# Patient Record
Sex: FEMALE | Race: WHITE | NOT HISPANIC OR LATINO | Employment: FULL TIME
[De-identification: names, ages, dates, MRNs, and addresses within clinical notes are randomized per-mention and may not be internally consistent; named-entity substitution may affect disease eponyms.]

---

## 2018-03-16 ENCOUNTER — TRANSCRIBE ORDERS (OUTPATIENT)
Dept: SCHEDULING | Age: 48
End: 2018-03-16

## 2018-03-16 DIAGNOSIS — R19.7 DIARRHEA, UNSPECIFIED TYPE: ICD-10-CM

## 2018-03-16 DIAGNOSIS — R10.11 ABDOMINAL PAIN, RIGHT UPPER QUADRANT: Primary | ICD-10-CM

## 2018-03-16 DIAGNOSIS — K50.00 CICATRIZING ENTEROCOLITIS (CMS/HCC): ICD-10-CM

## 2018-03-16 DIAGNOSIS — R11.0 NAUSEA: ICD-10-CM

## 2018-03-19 ENCOUNTER — TRANSCRIBE ORDERS (OUTPATIENT)
Dept: SCHEDULING | Age: 48
End: 2018-03-19

## 2018-03-19 DIAGNOSIS — R19.7 DIARRHEA, UNSPECIFIED TYPE: ICD-10-CM

## 2018-03-19 DIAGNOSIS — R10.11 ABDOMINAL PAIN, RIGHT UPPER QUADRANT: Primary | ICD-10-CM

## 2018-03-19 DIAGNOSIS — R11.0 NAUSEA: ICD-10-CM

## 2018-03-19 DIAGNOSIS — K50.00 CICATRIZING ENTEROCOLITIS (CMS/HCC): ICD-10-CM

## 2018-03-26 ENCOUNTER — HOSPITAL ENCOUNTER (OUTPATIENT)
Dept: RADIOLOGY | Facility: HOSPITAL | Age: 48
Discharge: HOME | End: 2018-03-26
Attending: INTERNAL MEDICINE
Payer: COMMERCIAL

## 2018-03-26 DIAGNOSIS — K50.00 CICATRIZING ENTEROCOLITIS (CMS/HCC): ICD-10-CM

## 2018-03-26 DIAGNOSIS — R10.11 ABDOMINAL PAIN, RIGHT UPPER QUADRANT: ICD-10-CM

## 2018-03-26 DIAGNOSIS — R11.0 NAUSEA: ICD-10-CM

## 2018-03-26 DIAGNOSIS — R19.7 DIARRHEA, UNSPECIFIED TYPE: ICD-10-CM

## 2018-03-26 PROCEDURE — 76700 US EXAM ABDOM COMPLETE: CPT

## 2018-03-27 ENCOUNTER — HOSPITAL ENCOUNTER (OUTPATIENT)
Dept: RADIOLOGY | Facility: HOSPITAL | Age: 48
Discharge: HOME | End: 2018-03-27
Attending: INTERNAL MEDICINE
Payer: COMMERCIAL

## 2018-03-27 VITALS — BODY MASS INDEX: 35.17 KG/M2 | WEIGHT: 206 LBS | HEIGHT: 64 IN

## 2018-03-27 DIAGNOSIS — R19.7 DIARRHEA, UNSPECIFIED TYPE: ICD-10-CM

## 2018-03-27 DIAGNOSIS — K50.00 CICATRIZING ENTEROCOLITIS (CMS/HCC): ICD-10-CM

## 2018-03-27 DIAGNOSIS — R11.0 NAUSEA: ICD-10-CM

## 2018-03-27 DIAGNOSIS — R10.11 ABDOMINAL PAIN, RIGHT UPPER QUADRANT: ICD-10-CM

## 2018-03-27 RX ORDER — GADOBUTROL 604.72 MG/ML
9.3 INJECTION INTRAVENOUS
Status: COMPLETED | OUTPATIENT
Start: 2018-03-27 | End: 2018-03-27

## 2018-03-27 RX ADMIN — GADOBUTROL 9.3 ML: 604.72 INJECTION INTRAVENOUS at 08:57

## 2018-03-27 NOTE — NURSING NOTE
Pt screened for Hyoscyamine contraindications using standard questions- no issues found.  Hyoscyamine 0.125 mg - 2 tabs - given SL @ 8:30 am per study requirements.   Lot [id number]  Exp 02/19  MALI ELIZALDE

## 2019-01-17 ENCOUNTER — OFFICE VISIT (OUTPATIENT)
Dept: FAMILY MEDICINE | Facility: CLINIC | Age: 49
End: 2019-01-17
Payer: COMMERCIAL

## 2019-01-17 VITALS
DIASTOLIC BLOOD PRESSURE: 78 MMHG | OXYGEN SATURATION: 98 % | WEIGHT: 216 LBS | SYSTOLIC BLOOD PRESSURE: 120 MMHG | RESPIRATION RATE: 18 BRPM | BODY MASS INDEX: 36.88 KG/M2 | HEART RATE: 84 BPM | HEIGHT: 64 IN | TEMPERATURE: 98.2 F

## 2019-01-17 DIAGNOSIS — J01.90 ACUTE BACTERIAL RHINOSINUSITIS: Primary | ICD-10-CM

## 2019-01-17 DIAGNOSIS — B96.89 ACUTE BACTERIAL RHINOSINUSITIS: Primary | ICD-10-CM

## 2019-01-17 PROCEDURE — 99213 OFFICE O/P EST LOW 20 MIN: CPT | Performed by: NURSE PRACTITIONER

## 2019-01-17 RX ORDER — AZITHROMYCIN 250 MG/1
TABLET, FILM COATED ORAL
Qty: 6 TABLET | Refills: 0 | Status: SHIPPED | OUTPATIENT
Start: 2019-01-17 | End: 2019-01-23 | Stop reason: ALTCHOICE

## 2019-01-17 RX ORDER — AZELASTINE HCL 205.5 UG/1
1 SPRAY NASAL 2 TIMES DAILY PRN
Qty: 30 ML | Refills: 1 | Status: SHIPPED | OUTPATIENT
Start: 2019-01-17 | End: 2019-07-16

## 2019-01-17 RX ORDER — BENZONATATE 100 MG/1
100 CAPSULE ORAL 3 TIMES DAILY PRN
Qty: 30 CAPSULE | Refills: 0 | Status: SHIPPED | OUTPATIENT
Start: 2019-01-17 | End: 2019-01-23 | Stop reason: ALTCHOICE

## 2019-01-17 ASSESSMENT — ENCOUNTER SYMPTOMS
COUGH: 1
NAUSEA: 0
DIARRHEA: 0
SORE THROAT: 1
FEVER: 0
CHILLS: 1
SHORTNESS OF BREATH: 1
VOMITING: 0
ABDOMINAL PAIN: 0
CONSTIPATION: 0
SINUS PAIN: 1
FATIGUE: 1

## 2019-01-17 NOTE — PATIENT INSTRUCTIONS
Swollen nasal passages can cause postnasal drip and cough.  Azelastine for nasal drainage, Tessalon for cough.  Drink warm beverages such as tea to dissolve secretions already in your throat, avoid milk products, and maintain hydration.    Concern for ABRS due to presentation, pt allergic to PCN, Zithromax prescribed.

## 2019-01-17 NOTE — PROGRESS NOTES
[facility]  [address] [place] [address]  [phone]     Reason for visit:   Chief Complaint   Patient presents with   • Sinusitis     sinus pressure and pain x 5 days, maxillary tenderness, upper teeth hurt      HPI   Lima Gutierrez is a 48 y.o. female who presents with rhinosinusitis        Medical History:  No past medical history on file.    Surgical History:  Past Surgical History:   Procedure Laterality Date   •  SECTION     • TONSILLECTOMY         Social History:  Social History     Social History Narrative   • No narrative on file       Family History:  Family History   Problem Relation Age of Onset   • Breast cancer Mother    • Hodgkin's lymphoma Mother    • Leukemia Father        Allergies:  Penicillins    Current Medications:  Current Outpatient Prescriptions   Medication Sig Dispense Refill   • FLUoxetine (PROzac) 10 mg capsule Take 10 mg by mouth daily.     • Lactobac 40-Bifido 3-S.thermop 100 billion cell capsule Take by mouth.     • omeprazole (PriLOSEC) 20 mg capsule Take 20 mg by mouth daily before breakfast.     • sulfaSALAzine (AZULFIDINE) 500 mg tablet Take 500 mg by mouth 4 (four) times a day.     • predniSONE (DELTASONE) 10 mg tablet Take 10 mg by mouth daily.       No current facility-administered medications for this visit.        Review of Systems:  Review of Systems   Constitutional: Positive for chills and fatigue. Negative for fever.   HENT: Positive for ear pain, postnasal drip, sinus pain and sore throat.    Respiratory: Positive for cough (productive) and shortness of breath (mild per pt, mild GASTELUM).    Cardiovascular: Negative for chest pain.   Gastrointestinal: Negative for abdominal pain, constipation, diarrhea, nausea and vomiting.       Objective     Vital Signs:  Vitals:    19 1431   BP: 120/78   Pulse: 84   Resp: 18   Temp: 36.8 °C (98.2 °F)   SpO2: 98%       BMI:  Body mass index is 37.08 kg/m². [unfilled]     Physical Exam    Constitutional: She is oriented to person, place, and time. Vital signs are normal. She appears well-developed and well-nourished.   HENT:   Head: Normocephalic and atraumatic.   Right Ear: Hearing and tympanic membrane normal.   Left Ear: Hearing and tympanic membrane normal.   Nose: Mucosal edema present. Right sinus exhibits maxillary sinus tenderness and frontal sinus tenderness. Left sinus exhibits maxillary sinus tenderness and frontal sinus tenderness.   Mouth/Throat: Uvula is midline.   PND present   Cardiovascular: Normal rate, regular rhythm and normal heart sounds.    Pulmonary/Chest: Effort normal and breath sounds normal.   Neurological: She is alert and oriented to person, place, and time.   Psychiatric: She has a normal mood and affect. Her speech is normal and behavior is normal. Judgment and thought content normal. Cognition and memory are normal.       Recent labs before today:     Lab Results   Component Value Date    WBC 6.53 04/07/2015    HGB 11.2 (L) 04/07/2015    HCT 34.1 (L) 04/07/2015     04/07/2015    ALT 18 04/05/2015    AST 15 04/05/2015     04/05/2015    K 4.0 04/05/2015     04/05/2015    CREATININE 0.6 04/05/2015    BUN 10 04/05/2015    CO2 25 04/05/2015        Procedures   Assessment   [unfilled]  Problem List Items Addressed This Visit     None              MADIE Mace  1/17/2019

## 2019-01-23 ENCOUNTER — HOSPITAL ENCOUNTER (EMERGENCY)
Facility: HOSPITAL | Age: 49
Discharge: HOME | End: 2019-01-23
Attending: EMERGENCY MEDICINE
Payer: COMMERCIAL

## 2019-01-23 ENCOUNTER — APPOINTMENT (EMERGENCY)
Dept: RADIOLOGY | Facility: HOSPITAL | Age: 49
End: 2019-01-23
Attending: EMERGENCY MEDICINE
Payer: COMMERCIAL

## 2019-01-23 VITALS
HEIGHT: 64 IN | SYSTOLIC BLOOD PRESSURE: 180 MMHG | RESPIRATION RATE: 18 BRPM | HEART RATE: 85 BPM | WEIGHT: 215 LBS | BODY MASS INDEX: 36.7 KG/M2 | DIASTOLIC BLOOD PRESSURE: 80 MMHG | OXYGEN SATURATION: 97 % | TEMPERATURE: 98.8 F
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
## 2019-01-23 PROCEDURE — 90471 IMMUNIZATION ADMIN: CPT | Performed by: PHYSICIAN ASSISTANT

## 2019-01-23 PROCEDURE — 63600000 HC DRUGS/DETAIL CODE: Performed by: PHYSICIAN ASSISTANT

## 2019-01-23 PROCEDURE — 3E0234Z INTRODUCTION OF SERUM, TOXOID AND VACCINE INTO MUSCLE, PERCUTANEOUS APPROACH: ICD-10-PCS | Performed by: EMERGENCY MEDICINE

## 2019-01-23 PROCEDURE — 90715 TDAP VACCINE 7 YRS/> IM: CPT | Performed by: PHYSICIAN ASSISTANT

## 2019-01-23 PROCEDURE — 70486 CT MAXILLOFACIAL W/O DYE: CPT

## 2019-01-23 PROCEDURE — 99284 EMERGENCY DEPT VISIT MOD MDM: CPT | Mod: 25

## 2019-01-23 RX ADMIN — TETANUS TOXOID, REDUCED DIPHTHERIA TOXOID AND ACELLULAR PERTUSSIS VACCINE, ADSORBED 0.5 ML: 5; 2.5; 8; 8; 2.5 SUSPENSION INTRAMUSCULAR at 17:19

## 2019-01-23 ASSESSMENT — ENCOUNTER SYMPTOMS
BRUISES/BLEEDS EASILY: 0
SEIZURES: 0
WOUND: 1
NECK PAIN: 0
LIGHT-HEADEDNESS: 0
FEVER: 0
NAUSEA: 1
COLOR CHANGE: 1
VOMITING: 0
LOSS OF CONSCIOUSNESS: 0
SHORTNESS OF BREATH: 0
DIZZINESS: 0
FACIAL SWELLING: 1
CONFUSION: 0
WEAKNESS: 0

## 2019-01-23 NOTE — ED PROVIDER NOTES
HPI     Chief Complaint   Patient presents with   • Fall     48 y.o. female presents to ED s/p mechanical fall that occurred last night. Pt was walking out of a restaurant last night and tripped over a curb outside in the parking lot and fell. She struck her face and presents with facial swelling and ecchymosis. Denies LOC. Tetanus status is unknown. Pt is not anticoagulated. Admits to nausea. Denies nosebleeds, vomiting, confusion, visual disturbances, dizziness, lightheadedness, seizures, fevers, neck pain, or dental injury.     History provided by:  Patient   used: No    Trauma  Mechanism of injury: fall  Injury location: face  Injury location detail: nose  Incident location: outdoors  Arrived directly from scene: no     Fall:       Fall occurred: tripped and walking       Impact surface: concrete       Point of impact: face       Entrapped after fall: no    Protective equipment:        None       Suspicion of alcohol use: no       Suspicion of drug use: no    EMS/PTA data:       Ambulatory at scene: yes       Oriented to: person, place, situation and time       Loss of consciousness: no       Amnesic to event: no    Current symptoms:       Associated symptoms:             Reports nausea.             Denies chest pain, loss of consciousness, neck pain, seizures and vomiting.     Relevant PMH:       Pharmacological risk factors:             No anticoagulation therapy.        Tetanus status: unknown       Patient History     Past Medical History:   Diagnosis Date   • Crohn's colitis (CMS/HCC) (HCC)        Past Surgical History:   Procedure Laterality Date   •  SECTION     • TONSILLECTOMY         Family History   Problem Relation Age of Onset   • Breast cancer Mother    • Hodgkin's lymphoma Mother    • Leukemia Father        Social History   Substance Use Topics   • Smoking status: Current Every Day Smoker   • Smokeless tobacco: Never Used   • Alcohol use Not on file       Systems  "Reviewed from Nursing Triage:          Review of Systems     Review of Systems   Constitutional: Negative for fever.   HENT: Positive for facial swelling. Negative for dental problem, ear discharge and nosebleeds.    Eyes: Negative for visual disturbance.   Respiratory: Negative for shortness of breath.    Cardiovascular: Negative for chest pain.   Gastrointestinal: Positive for nausea. Negative for vomiting.   Musculoskeletal: Negative for gait problem and neck pain.   Skin: Positive for color change (b/l periorbital ecchymosis) and wound (multiple facial abrasions).   Neurological: Negative for dizziness, seizures, loss of consciousness, weakness and light-headedness.   Hematological: Does not bruise/bleed easily.   Psychiatric/Behavioral: Negative for behavioral problems and confusion.        Physical Exam     ED Triage Vitals [01/23/19 1516]   Temp Heart Rate Resp BP SpO2   37.1 °C (98.8 °F) 78 18 (!) 227/98 97 %      Temp Source Heart Rate Source Patient Position BP Location FiO2 (%) (Set)   Temporal -- -- -- --                     Patient Vitals for the past 24 hrs:   BP Temp Temp src Pulse Resp SpO2 Height Weight   01/23/19 1516 (!) 227/98 37.1 °C (98.8 °F) Temporal 78 18 97 % 1.626 m (5' 4\") 97.5 kg (215 lb)           Physical Exam   Constitutional: She is oriented to person, place, and time. She appears well-developed and well-nourished. No distress.   HENT:   Head: Normocephalic.   Right Ear: External ear normal.   Left Ear: External ear normal.   Nose: No epistaxis.   Mouth/Throat: Uvula is midline, oropharynx is clear and moist and mucous membranes are normal. Normal dentition.   No dried blood to b/l nares.   Abrasion and swelling to nasal bridge.  Abrasion to the upper lip and to the inside of her right upper lip.   Ecchymosis to b/l periorbitals.    Eyes: Conjunctivae and EOM are normal. Pupils are equal, round, and reactive to light.   Neck: Normal range of motion. Neck supple.   Cardiovascular: " Normal rate.    Pulmonary/Chest: Effort normal. No respiratory distress.   Musculoskeletal: Normal range of motion. She exhibits no edema, tenderness or deformity.   Neurological: She is alert and oriented to person, place, and time. She has normal strength. She is not disoriented. No sensory deficit.   Skin: Skin is warm and dry.   Psychiatric: She has a normal mood and affect. Her behavior is normal. Judgment and thought content normal.   Nursing note and vitals reviewed.           Procedures    ED Course & [facility]     Labs Reviewed - No data to display    CT FACIAL BONES WITHOUT IV CONTRAST    (Results Pending)               [facility]         ED Course as of Jan 23 1541 Wed Jan 23, 2019 1541 Impression: facial injury s/p mechanical fall x last night  Plan: CT facial bones, tetanus     [CK]      ED Course User Index  [CK] Awa Boles      By signing my name below, Awa MCDANIELS, attest that this documentation has been prepared under the direction and in the presence of STEFAN Barfield PA-C.    1/23/2019 3:41 PM      Opal MCDANIELS, personally performed the services described in this documentation, as documented by the scribe in my presence, and it is both accurate and complete.  1/23/2019 3:49 PM           Awa Boles  01/23/19 1540       Awa Boles  01/23/19 1542       Opal Barfield PA C  01/23/19 1549

## 2019-01-23 NOTE — ED ATTESTATION NOTE
1/23/20195:17 PM  I have personally seen and examined the patient.  I reviewed and agree with the PA/NP/Resident's assessment and plan of care.         Fady Mendoza,   01/23/19 1717

## 2019-02-15 RX ORDER — FLUOXETINE 10 MG/1
10 CAPSULE ORAL DAILY
Qty: 90 CAPSULE | Refills: 3 | Status: SHIPPED | OUTPATIENT
Start: 2019-02-15 | End: 2019-02-18 | Stop reason: SDUPTHER

## 2019-02-18 ENCOUNTER — OFFICE VISIT (OUTPATIENT)
Dept: FAMILY MEDICINE | Facility: CLINIC | Age: 49
End: 2019-02-18
Payer: COMMERCIAL

## 2019-02-18 VITALS
WEIGHT: 211.4 LBS | OXYGEN SATURATION: 99 % | DIASTOLIC BLOOD PRESSURE: 90 MMHG | SYSTOLIC BLOOD PRESSURE: 145 MMHG | TEMPERATURE: 98.3 F | HEART RATE: 72 BPM | RESPIRATION RATE: 20 BRPM | HEIGHT: 64 IN | BODY MASS INDEX: 36.09 KG/M2

## 2019-02-18 DIAGNOSIS — F32.9 MAJOR DEPRESSIVE DISORDER WITH CURRENT ACTIVE EPISODE, UNSPECIFIED DEPRESSION EPISODE SEVERITY, UNSPECIFIED WHETHER RECURRENT: ICD-10-CM

## 2019-02-18 DIAGNOSIS — Z00.00 ENCOUNTER FOR GENERAL ADULT MEDICAL EXAMINATION WITHOUT ABNORMAL FINDINGS: Primary | ICD-10-CM

## 2019-02-18 DIAGNOSIS — K21.9 GASTROESOPHAGEAL REFLUX DISEASE WITHOUT ESOPHAGITIS: ICD-10-CM

## 2019-02-18 DIAGNOSIS — E66.812 CLASS 2 OBESITY DUE TO EXCESS CALORIES WITHOUT SERIOUS COMORBIDITY WITH BODY MASS INDEX (BMI) OF 36.0 TO 36.9 IN ADULT: ICD-10-CM

## 2019-02-18 DIAGNOSIS — R03.0 ELEVATED BLOOD PRESSURE READING WITHOUT DIAGNOSIS OF HYPERTENSION: ICD-10-CM

## 2019-02-18 DIAGNOSIS — K50.10 CROHN'S DISEASE OF COLON WITHOUT COMPLICATION (CMS/HCC): ICD-10-CM

## 2019-02-18 DIAGNOSIS — E66.09 CLASS 2 OBESITY DUE TO EXCESS CALORIES WITHOUT SERIOUS COMORBIDITY WITH BODY MASS INDEX (BMI) OF 36.0 TO 36.9 IN ADULT: ICD-10-CM

## 2019-02-18 PROBLEM — Q21.10 ASD (ATRIAL SEPTAL DEFECT): Status: ACTIVE | Noted: 2019-02-18

## 2019-02-18 PROBLEM — J30.9 ALLERGIC RHINITIS: Status: ACTIVE | Noted: 2019-02-18

## 2019-02-18 PROCEDURE — 93000 ELECTROCARDIOGRAM COMPLETE: CPT | Performed by: FAMILY MEDICINE

## 2019-02-18 PROCEDURE — 99396 PREV VISIT EST AGE 40-64: CPT | Performed by: FAMILY MEDICINE

## 2019-02-18 RX ORDER — SULFASALAZINE 500 MG/1
500 TABLET ORAL 4 TIMES DAILY
COMMUNITY
Start: 2019-02-18

## 2019-02-18 RX ORDER — FLUOXETINE 10 MG/1
10 CAPSULE ORAL DAILY
Qty: 90 CAPSULE | Refills: 3 | Status: SHIPPED | OUTPATIENT
Start: 2019-02-18 | End: 2020-02-07

## 2019-02-18 RX ORDER — OMEPRAZOLE 20 MG/1
20 CAPSULE, DELAYED RELEASE ORAL
COMMUNITY
Start: 2019-02-18

## 2019-02-18 ASSESSMENT — ENCOUNTER SYMPTOMS
SHORTNESS OF BREATH: 0
BRUISES/BLEEDS EASILY: 0
FREQUENCY: 0
WEAKNESS: 0
JOINT SWELLING: 0
DYSPHORIC MOOD: 0
PALPITATIONS: 0
ABDOMINAL PAIN: 0
UNEXPECTED WEIGHT CHANGE: 1

## 2019-02-18 NOTE — PROGRESS NOTES
Chief Complaint  Chief Complaint   Patient presents with   • Annual Exam       History of Present Illness  Former pt at [facility].  Here for PE. Sees Dr Garcia for Crohn's. Has reasonably good control.  Has been avoiding Biologics and getting about 1 Crohn's flare a year that required steroid.  Depression has been controlled.  Discussed whether or not she wanted to try to go off Prozac and stated it has been quite helpful and she is now on a lower dose than previously and she does not want to try off it.  Aware of benefits/risks.    Has gained weight, about 10 pounds this yr.  BP has been elevated.  Working on diet.  No reg exercise and plans to work on that soon.   Due for fasting labs.  Has gyn  and then will have mammo.  Tdap up-to-date recently.        Current Outpatient Prescriptions   Medication Sig Dispense Refill   • FLUoxetine (PROzac) 10 mg capsule Take 1 capsule (10 mg total) by mouth daily. 90 capsule 3   • Lactobac 40-Bifido 3-S.thermop 100 billion cell capsule Take by mouth.     • omeprazole (PriLOSEC) 20 mg capsule Take 20 mg by mouth daily before breakfast.     • sulfaSALAzine (AZULFIDINE) 500 mg tablet Take 500 mg by mouth 4 (four) times a day.     • azelastine 0.15 % (205.5 mcg) nasal spray Administer 1 spray into each nostril 2 (two) times a day as needed for rhinitis. (Patient not taking: Reported on 2019 ) 30 mL 1     No current facility-administered medications for this visit.        There is no problem list on file for this patient.      Past Medical History:   Diagnosis Date   • Crohn's colitis (CMS/HCC) (HCC)        Past Surgical History:   Procedure Laterality Date   •  SECTION     • TONSILLECTOMY         Family History   Problem Relation Age of Onset   • Breast cancer Mother    • Hodgkin's lymphoma Mother    • Leukemia Father        Social History     Social History   • Marital status:      Spouse name: N/A   • Number of children: N/A   • Years of education: N/A  "    Occupational History   • Not on file.     Social History Main Topics   • Smoking status: Current Every Day Smoker   • Smokeless tobacco: Never Used   • Alcohol use Yes      Comment: socially   • Drug use: No   • Sexual activity: Not on file     Other Topics Concern   • Not on file     Social History Narrative   • No narrative on file       Allergies   Allergen Reactions   • Penicillins Rash       Review of Systems   Constitutional: Positive for unexpected weight change (Weight gain).   HENT: Negative for ear pain.    Eyes: Negative for visual disturbance.   Respiratory: Negative for shortness of breath.    Cardiovascular: Negative for chest pain and palpitations.   Gastrointestinal: Negative for abdominal pain.   Endocrine: Negative for polyuria.   Genitourinary: Negative for frequency.   Musculoskeletal: Negative for joint swelling.   Skin: Negative for rash.   Allergic/Immunologic: Negative for immunocompromised state.   Neurological: Negative for weakness.   Hematological: Does not bruise/bleed easily.   Psychiatric/Behavioral: Negative for dysphoric mood.       Vitals:    02/18/19 1745   BP: (!) 145/90   BP Location: Left upper arm   Patient Position: Sitting   Pulse: 72   Resp: 20   Temp: 36.8 °C (98.3 °F)   TempSrc: Oral   SpO2: 99%   Weight: 95.9 kg (211 lb 6.4 oz)   Height: 1.626 m (5' 4\")     Body mass index is 36.29 kg/m².    Physical Exam   Constitutional: She is oriented to person, place, and time. She appears well-developed and well-nourished.   HENT:   Head: Normocephalic.   Right Ear: External ear normal.   Left Ear: External ear normal.   Nose: Nose normal.   Mouth/Throat: Oropharynx is clear and moist. No oropharyngeal exudate.   Eyes: Conjunctivae and EOM are normal. Pupils are equal, round, and reactive to light.   Neck: Normal range of motion. No thyromegaly present.   Cardiovascular: Normal rate, regular rhythm and intact distal pulses.  Exam reveals no gallop and no friction rub.    Murmur " (Trace 1/6 diastolic murmur) heard.  Pulmonary/Chest: Effort normal and breath sounds normal. She has no wheezes. She has no rales.   Abdominal: Soft. Bowel sounds are normal. She exhibits no distension. There is no tenderness.   Musculoskeletal: Normal range of motion.   Lymphadenopathy:     She has no cervical adenopathy.   Neurological: She is alert and oriented to person, place, and time. Coordination normal.   Skin: No rash noted.   Psychiatric: She has a normal mood and affect. Her behavior is normal. Judgment and thought content normal.   Nursing note and vitals reviewed.    EKG with a normal sinus rhythm, no abnormality of intervals.  Possible left atrial enlargement, no acute ST-T wave changes and no prior for comparison.    Problem List Items Addressed This Visit        Other    Class 2 obesity due to excess calories with body mass index (BMI) of 36.0 to 36.9 in adult     Weight loss goals discussed.         Major depressive disorder     Continues on Prozac and refilled.  Wants to continue at the same dose.         Relevant Medications    FLUoxetine (PROzac) 10 mg capsule    Crohn's colitis (CMS/HCC) ([facility])     Has reasonably good control and continuing follow-up with Dr. Garcia.         Relevant Medications    sulfaSALAzine (AZULFIDINE) 500 mg tablet    Gastroesophageal reflux disease without esophagitis    Relevant Medications    omeprazole (PriLOSEC) 20 mg capsule      Other Visit Diagnoses     Encounter for general adult medical examination without abnormal findings    -  Primary    Regular exercise discussed.  Check fasting labs    Relevant Orders    Comprehensive metabolic panel    CBC and Differential    Lipid Prof w Refl    TSH w reflex FT4    Urinalysis with microscopic    Elevated blood pressure reading without diagnosis of hypertension        Discussed weight loss, increase in exercise, low sodium and caffeine.  Recheck BP 3 months.  Meds if still high.    Relevant Orders    Comprehensive  metabolic panel    CBC and Differential    Lipid Prof w Refl    TSH w reflex FT4    Urinalysis with microscopic    ECG 12 LEAD OFFICE PERFORMED (Completed)          Return in about 3 months (around 5/18/2019).          Jessica Goodman MD  [facility]  Family Medicine in [place]  [address] [place] [address],  [address]  [place]  [address]  P: [phone]  F: [phone]

## 2019-03-05 LAB
ALBUMIN SERPL-MCNC: 4.3 G/DL (ref 3.5–5.5)
ALBUMIN/GLOB SERPL: 1.7 {RATIO} (ref 1.2–2.2)
ALP SERPL-CCNC: 59 IU/L (ref 39–117)
ALT SERPL-CCNC: 48 IU/L (ref 0–32)
AST SERPL-CCNC: 38 IU/L (ref 0–40)
BASOPHILS # BLD AUTO: 0 X10E3/UL (ref 0–0.2)
BASOPHILS NFR BLD AUTO: 1 %
BILIRUB SERPL-MCNC: 0.3 MG/DL (ref 0–1.2)
BUN SERPL-MCNC: 11 MG/DL (ref 6–24)
BUN/CREAT SERPL: 16 (ref 9–23)
CALCIUM SERPL-MCNC: 9 MG/DL (ref 8.7–10.2)
CHLORIDE SERPL-SCNC: 104 MMOL/L (ref 96–106)
CHOLEST SERPL-MCNC: 179 MG/DL (ref 100–199)
CO2 SERPL-SCNC: 21 MMOL/L (ref 20–29)
CREAT SERPL-MCNC: 0.7 MG/DL (ref 0.57–1)
EOSINOPHIL # BLD AUTO: 0.1 X10E3/UL (ref 0–0.4)
EOSINOPHIL NFR BLD AUTO: 2 %
ERYTHROCYTE [DISTWIDTH] IN BLOOD BY AUTOMATED COUNT: 14.2 % (ref 12.3–15.4)
GLOBULIN SER CALC-MCNC: 2.6 G/DL (ref 1.5–4.5)
GLUCOSE SERPL-MCNC: 111 MG/DL (ref 65–99)
HCT VFR BLD AUTO: 37.5 % (ref 34–46.6)
HDLC SERPL-MCNC: 71 MG/DL
HGB BLD-MCNC: 12.5 G/DL (ref 11.1–15.9)
IMM GRANULOCYTES # BLD AUTO: 0 X10E3/UL (ref 0–0.1)
IMM GRANULOCYTES NFR BLD AUTO: 0 %
LAB CORP EGFR IF AFRICN AM: 119 ML/MIN/1.73
LAB CORP EGFR IF NONAFRICN AM: 104 ML/MIN/1.73
LDLC SERPL CALC-MCNC: 93 MG/DL (ref 0–99)
LDLC/HDLC SERPL: 1.3 RATIO (ref 0–3.2)
LYMPHOCYTES # BLD AUTO: 1.5 X10E3/UL (ref 0.7–3.1)
LYMPHOCYTES NFR BLD AUTO: 40 %
MCH RBC QN AUTO: 31.3 PG (ref 26.6–33)
MCHC RBC AUTO-ENTMCNC: 33.3 G/DL (ref 31.5–35.7)
MCV RBC AUTO: 94 FL (ref 79–97)
MONOCYTES # BLD AUTO: 0.4 X10E3/UL (ref 0.1–0.9)
MONOCYTES NFR BLD AUTO: 10 %
NEUTROPHILS # BLD AUTO: 1.7 X10E3/UL (ref 1.4–7)
NEUTROPHILS NFR BLD AUTO: 47 %
PLATELET # BLD AUTO: 179 X10E3/UL (ref 150–379)
POTASSIUM SERPL-SCNC: 4.5 MMOL/L (ref 3.5–5.2)
PROT SERPL-MCNC: 6.9 G/DL (ref 6–8.5)
RBC # BLD AUTO: 3.99 X10E6/UL (ref 3.77–5.28)
SODIUM SERPL-SCNC: 141 MMOL/L (ref 134–144)
TRIGL SERPL-MCNC: 76 MG/DL (ref 0–149)
VLDLC SERPL CALC-MCNC: 15 MG/DL (ref 5–40)
WBC # BLD AUTO: 3.7 X10E3/UL (ref 3.4–10.8)

## 2019-03-06 PROBLEM — R79.89 ELEVATED LFTS: Status: ACTIVE | Noted: 2019-03-06

## 2019-03-06 PROBLEM — R73.01 IMPAIRED FASTING GLUCOSE: Status: ACTIVE | Noted: 2019-03-06

## 2019-03-06 LAB
APPEARANCE UR: (no result)
BACTERIA #/AREA URNS HPF: NORMAL /[HPF]
BILIRUB UR QL STRIP: NEGATIVE
COLOR UR: YELLOW
EPI CELLS #/AREA URNS HPF: NORMAL /HPF
GLUCOSE UR QL: NEGATIVE
HGB UR QL STRIP: NEGATIVE
KETONES UR QL STRIP: NEGATIVE
LEUKOCYTE ESTERASE UR QL STRIP: NEGATIVE
MICRO URNS: (no result)
MICRO URNS: (no result)
MUCOUS THREADS URNS QL MICRO: PRESENT
NITRITE UR QL STRIP: NEGATIVE
PH UR STRIP: 7 [PH] (ref 5–7.5)
PROT UR QL STRIP: NEGATIVE
RBC #/AREA URNS HPF: NORMAL /HPF
SP GR UR: 1.02 (ref 1–1.03)
T4 FREE SERPL-MCNC: 1.04 NG/DL (ref 0.82–1.77)
TSH SERPL DL<=0.005 MIU/L-ACNC: 2.86 UIU/ML (ref 0.45–4.5)
UROBILINOGEN UR STRIP-MCNC: 0.2 EU/DL (ref 0.2–1)
WBC #/AREA URNS HPF: NORMAL /HPF

## 2019-03-12 DIAGNOSIS — R79.89 ELEVATED LFTS: ICD-10-CM

## 2019-03-12 DIAGNOSIS — R73.01 ELEVATED FASTING GLUCOSE: Primary | ICD-10-CM

## 2020-02-06 DIAGNOSIS — F32.9 MAJOR DEPRESSIVE DISORDER WITH CURRENT ACTIVE EPISODE, UNSPECIFIED DEPRESSION EPISODE SEVERITY, UNSPECIFIED WHETHER RECURRENT: ICD-10-CM

## 2020-02-07 RX ORDER — FLUOXETINE 10 MG/1
CAPSULE ORAL
Qty: 90 CAPSULE | Refills: 3 | Status: SHIPPED | OUTPATIENT
Start: 2020-02-07 | End: 2021-03-01 | Stop reason: SDUPTHER

## 2020-02-25 ENCOUNTER — OFFICE VISIT (OUTPATIENT)
Dept: FAMILY MEDICINE | Facility: CLINIC | Age: 50
End: 2020-02-25
Payer: COMMERCIAL

## 2020-02-25 VITALS
DIASTOLIC BLOOD PRESSURE: 85 MMHG | HEART RATE: 65 BPM | WEIGHT: 209 LBS | HEIGHT: 64 IN | OXYGEN SATURATION: 97 % | BODY MASS INDEX: 35.68 KG/M2 | RESPIRATION RATE: 20 BRPM | TEMPERATURE: 97.5 F | SYSTOLIC BLOOD PRESSURE: 138 MMHG

## 2020-02-25 DIAGNOSIS — Z00.00 ENCOUNTER FOR GENERAL ADULT MEDICAL EXAMINATION WITHOUT ABNORMAL FINDINGS: Primary | ICD-10-CM

## 2020-02-25 DIAGNOSIS — E66.09 CLASS 2 OBESITY DUE TO EXCESS CALORIES WITHOUT SERIOUS COMORBIDITY WITH BODY MASS INDEX (BMI) OF 36.0 TO 36.9 IN ADULT: ICD-10-CM

## 2020-02-25 DIAGNOSIS — R73.01 IMPAIRED FASTING GLUCOSE: ICD-10-CM

## 2020-02-25 DIAGNOSIS — Z12.31 ENCOUNTER FOR SCREENING MAMMOGRAM FOR MALIGNANT NEOPLASM OF BREAST: ICD-10-CM

## 2020-02-25 DIAGNOSIS — K21.9 GASTROESOPHAGEAL REFLUX DISEASE WITHOUT ESOPHAGITIS: ICD-10-CM

## 2020-02-25 DIAGNOSIS — K50.10 CROHN'S DISEASE OF COLON WITHOUT COMPLICATION (CMS/HCC): ICD-10-CM

## 2020-02-25 DIAGNOSIS — E66.812 CLASS 2 OBESITY DUE TO EXCESS CALORIES WITHOUT SERIOUS COMORBIDITY WITH BODY MASS INDEX (BMI) OF 36.0 TO 36.9 IN ADULT: ICD-10-CM

## 2020-02-25 PROCEDURE — 99396 PREV VISIT EST AGE 40-64: CPT | Performed by: FAMILY MEDICINE

## 2020-02-25 NOTE — PROGRESS NOTES
Chief Complaint  Chief Complaint   Patient presents with   • Annual Exam       History of Present Illness  Here for overdue PE.  Lost weight last yr but gained it back . Was on keto diet. Plans to resume . Not a lot of exercise.   Had flu shot.   Overdue for gyn and did schedule.   Needs mammo.   On Prozac and depression sx controlled.   Up-to-date with eye and dental exams.  Crohn's disease has been under good control on her current regimen.  GERD controlled on Prilosec.        Current Outpatient Medications   Medication Sig Dispense Refill   • FLUoxetine (PROzac) 10 mg capsule TAKE 1 CAPSULE BY MOUTH  DAILY 90 capsule 3   • omeprazole (PriLOSEC) 20 mg capsule Take 1 capsule (20 mg total) by mouth daily before breakfast.     • sulfaSALAzine (AZULFIDINE) 500 mg tablet Take 1 tablet (500 mg total) by mouth 4 (four) times a day.     • Lactobac 40-Bifido 3-S.thermop 100 billion cell capsule Take by mouth.       No current facility-administered medications for this visit.        Patient Active Problem List   Diagnosis   • Class 2 obesity due to excess calories with body mass index (BMI) of 36.0 to 36.9 in adult   • Major depressive disorder   • Allergic rhinitis   • Crohn's colitis (CMS/HCC)   • Gastroesophageal reflux disease without esophagitis   • ASD (atrial septal defect)   • Elevated LFTs   • Impaired fasting glucose       Past Medical History:   Diagnosis Date   • Allergic rhinitis 2019   • Class 2 obesity due to excess calories with body mass index (BMI) of 36.0 to 36.9 in adult 2019   • Crohn's colitis (CMS/HCC)    • Elevated LFTs 3/6/2019   • Gastroesophageal reflux disease without esophagitis 2019   • Impaired fasting glucose 3/6/2019   • Major depressive disorder 2019       Past Surgical History:   Procedure Laterality Date   •  SECTION     • TONSILLECTOMY         Family History   Problem Relation Age of Onset   • Breast cancer Biological Mother    • Hodgkin's lymphoma Biological  Mother    • Leukemia Biological Father        Social History     Socioeconomic History   • Marital status:      Spouse name: Not on file   • Number of children: Not on file   • Years of education: Not on file   • Highest education level: Not on file   Occupational History   • Not on file   Social Needs   • Financial resource strain: Not on file   • Food insecurity:     Worry: Not on file     Inability: Not on file   • Transportation needs:     Medical: Not on file     Non-medical: Not on file   Tobacco Use   • Smoking status: Current Every Day Smoker   • Smokeless tobacco: Never Used   Substance and Sexual Activity   • Alcohol use: Yes     Comment: socially   • Drug use: No   • Sexual activity: Not on file   Lifestyle   • Physical activity:     Days per week: Not on file     Minutes per session: Not on file   • Stress: Not on file   Relationships   • Social connections:     Talks on phone: Not on file     Gets together: Not on file     Attends [employer] service: Not on file     Active member of club or organization: Not on file     Attends meetings of clubs or organizations: Not on file     Relationship status: Not on file   • Intimate partner violence:     Fear of current or ex partner: Not on file     Emotionally abused: Not on file     Physically abused: Not on file     Forced sexual activity: Not on file   Other Topics Concern   • Not on file   Social History Narrative   • Not on file       Allergies   Allergen Reactions   • Penicillins Rash       Review of Systems   Constitutional: Negative for unexpected weight change.   HENT: Negative for ear pain.    Eyes: Negative for visual disturbance.   Respiratory: Negative for shortness of breath.    Cardiovascular: Negative for chest pain and palpitations.   Gastrointestinal: Negative for abdominal pain.   Endocrine: Negative for polyuria.   Genitourinary: Negative for frequency.   Musculoskeletal: Negative for joint swelling.   Skin: Negative for rash.  "  Allergic/Immunologic: Negative for immunocompromised state.   Neurological: Negative for weakness.   Hematological: Does not bruise/bleed easily.   Psychiatric/Behavioral: Negative for dysphoric mood.       Vitals:    02/25/20 1402   BP: (!) 190/98   BP Location: Left upper arm   Patient Position: Sitting   Pulse: 65   Resp: 20   Temp: 36.4 °C (97.5 °F)   TempSrc: Oral   SpO2: 97%   Weight: 94.8 kg (209 lb)   Height: 1.62 m (5' 3.78\")     Body mass index is 36.12 kg/m².    Physical Exam   Constitutional: She is oriented to person, place, and time. She appears well-developed and well-nourished.   HENT:   Head: Normocephalic.   Right Ear: External ear normal.   Left Ear: External ear normal.   Nose: Nose normal.   Mouth/Throat: Oropharynx is clear and moist.   Eyes: Pupils are equal, round, and reactive to light. Conjunctivae and EOM are normal.   Neck: Normal range of motion. No thyromegaly present.   Cardiovascular: Normal rate, regular rhythm, normal heart sounds and intact distal pulses. Exam reveals no gallop and no friction rub.   No murmur heard.  Pulmonary/Chest: Effort normal and breath sounds normal. She has no wheezes. She has no rales.   Abdominal: Soft. Bowel sounds are normal. She exhibits no distension. There is no tenderness.   Musculoskeletal: Normal range of motion.   Lymphadenopathy:     She has no cervical adenopathy.   Neurological: She is alert and oriented to person, place, and time. Coordination normal.   Skin: No rash noted.   Psychiatric: She has a normal mood and affect. Her behavior is normal. Judgment and thought content normal.   Nursing note and vitals reviewed.      Problem List Items Addressed This Visit        Active Problems    Class 2 obesity due to excess calories with body mass index (BMI) of 36.0 to 36.9 in adult     Weight goals discussed.         Relevant Orders    TSH w reflex FT4    Crohn's colitis (CMS/HCC)     Responding well to treatment and continues follow-up with " GI.         Gastroesophageal reflux disease without esophagitis     Controlled with current medication.         Impaired fasting glucose     Work on lower sugars and carbohydrate in diet and regular aerobic exercise.  Follow labs.         Relevant Orders    Hemoglobin A1c    TSH w reflex FT4      Other Visit Diagnoses     Encounter for general adult medical examination without abnormal findings    -  Primary    Urged getting back to healthy diet, regular exercise and goal of weight loss.    Relevant Orders    Comprehensive metabolic panel    CBC and Differential    Lipid Prof w Refl    Encounter for screening mammogram for malignant neoplasm of breast        Relevant Orders    BI SCREENING MAMMOGRAM BILATERAL          Return in about 6 months (around 8/25/2020) for Next scheduled follow-up.          Jessica Goodman MD  [facility]  Family Medicine in [place]  [address] [facility],  [address]  [place]  [address]  P: [phone]  F: [phone]

## 2020-02-26 ASSESSMENT — ENCOUNTER SYMPTOMS
PALPITATIONS: 0
ABDOMINAL PAIN: 0
FREQUENCY: 0
BRUISES/BLEEDS EASILY: 0
WEAKNESS: 0
UNEXPECTED WEIGHT CHANGE: 0
SHORTNESS OF BREATH: 0
DYSPHORIC MOOD: 0
JOINT SWELLING: 0

## 2021-03-01 ENCOUNTER — OFFICE VISIT (OUTPATIENT)
Dept: FAMILY MEDICINE | Facility: CLINIC | Age: 51
End: 2021-03-01
Payer: COMMERCIAL

## 2021-03-01 ENCOUNTER — TELEPHONE (OUTPATIENT)
Dept: FAMILY MEDICINE | Facility: CLINIC | Age: 51
End: 2021-03-01

## 2021-03-01 VITALS
HEART RATE: 64 BPM | TEMPERATURE: 98.4 F | SYSTOLIC BLOOD PRESSURE: 128 MMHG | WEIGHT: 220 LBS | RESPIRATION RATE: 16 BRPM | DIASTOLIC BLOOD PRESSURE: 86 MMHG | OXYGEN SATURATION: 98 % | BODY MASS INDEX: 37.56 KG/M2 | HEIGHT: 64 IN

## 2021-03-01 DIAGNOSIS — Q21.10 ASD (ATRIAL SEPTAL DEFECT): ICD-10-CM

## 2021-03-01 DIAGNOSIS — K21.9 GASTROESOPHAGEAL REFLUX DISEASE WITHOUT ESOPHAGITIS: ICD-10-CM

## 2021-03-01 DIAGNOSIS — Z00.00 ENCOUNTER FOR GENERAL ADULT MEDICAL EXAMINATION WITHOUT ABNORMAL FINDINGS: Primary | ICD-10-CM

## 2021-03-01 DIAGNOSIS — Z80.8 FAMILY HISTORY OF THYROID CANCER: ICD-10-CM

## 2021-03-01 DIAGNOSIS — R73.01 IMPAIRED FASTING GLUCOSE: ICD-10-CM

## 2021-03-01 DIAGNOSIS — R79.89 ELEVATED LFTS: ICD-10-CM

## 2021-03-01 DIAGNOSIS — E66.812 CLASS 2 OBESITY DUE TO EXCESS CALORIES WITHOUT SERIOUS COMORBIDITY WITH BODY MASS INDEX (BMI) OF 36.0 TO 36.9 IN ADULT: ICD-10-CM

## 2021-03-01 DIAGNOSIS — K50.10 CROHN'S DISEASE OF COLON WITHOUT COMPLICATION (CMS/HCC): ICD-10-CM

## 2021-03-01 DIAGNOSIS — F32.9 MAJOR DEPRESSIVE DISORDER WITH CURRENT ACTIVE EPISODE, UNSPECIFIED DEPRESSION EPISODE SEVERITY, UNSPECIFIED WHETHER RECURRENT: ICD-10-CM

## 2021-03-01 DIAGNOSIS — E66.09 CLASS 2 OBESITY DUE TO EXCESS CALORIES WITHOUT SERIOUS COMORBIDITY WITH BODY MASS INDEX (BMI) OF 36.0 TO 36.9 IN ADULT: ICD-10-CM

## 2021-03-01 PROBLEM — Z80.3 FAMILY HISTORY OF BREAST CANCER: Status: ACTIVE | Noted: 2021-03-01

## 2021-03-01 PROCEDURE — 99396 PREV VISIT EST AGE 40-64: CPT | Performed by: FAMILY MEDICINE

## 2021-03-01 RX ORDER — FLUOXETINE 10 MG/1
10 CAPSULE ORAL
Qty: 90 CAPSULE | Refills: 3 | Status: SHIPPED | OUTPATIENT
Start: 2021-03-01 | End: 2022-03-10

## 2021-03-01 ASSESSMENT — ENCOUNTER SYMPTOMS
WEAKNESS: 0
ABDOMINAL PAIN: 0
ADENOPATHY: 0
TROUBLE SWALLOWING: 0
JOINT SWELLING: 0
UNEXPECTED WEIGHT CHANGE: 0
DYSPHORIC MOOD: 0
PALPITATIONS: 0
FREQUENCY: 0
SHORTNESS OF BREATH: 0

## 2021-03-01 ASSESSMENT — PATIENT HEALTH QUESTIONNAIRE - PHQ9
SUM OF ALL RESPONSES TO PHQ QUESTIONS 1-9: 4
SUM OF ALL RESPONSES TO PHQ9 QUESTIONS 1 & 2: 1

## 2021-03-01 NOTE — PROGRESS NOTES
Chief Complaint  Chief Complaint   Patient presents with   • Annual Exam     Est. patient PE + BP check.       History of Present Illness  50-year-old female, here for PE.  Working at home during pandemic. Sitting more. No exercise .   Eating more carbs. Has gained weight.   Has low energy.  Depression sx increased a little.  Discussed option of the same dose of medication or increasing her Prozac.  Will see if Spring helps and if not increase Prozac.   Had mammo at [facility].  Up-to-date with gynecologist.  No cardiovascular complaints.  Crohn's colitis has been under control with her current medication regimen.  Sees GI for follow-up.  GERD has been under control.  Has history of ASD and no problems have arisen and has stopped seeing cardiologist.  Discussed family history of thyroid cancer in 3 of her cousins.  Mother's identical twin never had thyroid cancer but her mother had thyroid cancer along with breast cancer and lymphoma.  We discussed that she should review with her mother if she ever had genetic counseling or her cousins ever had genetic counseling that would be helpful to her for screening.          Current Outpatient Medications   Medication Sig Dispense Refill   • FLUoxetine (PROzac) 10 mg capsule Take 1 capsule (10 mg total) by mouth once daily. 90 capsule 3   • Lactobac 40-Bifido 3-S.thermop 100 billion cell capsule Take by mouth.     • omeprazole (PriLOSEC) 20 mg capsule Take 1 capsule (20 mg total) by mouth daily before breakfast.     • sulfaSALAzine (AZULFIDINE) 500 mg tablet Take 1 tablet (500 mg total) by mouth 4 (four) times a day.       No current facility-administered medications for this visit.        Patient Active Problem List   Diagnosis   • Class 2 obesity due to excess calories with body mass index (BMI) of 36.0 to 36.9 in adult   • Major depressive disorder   • Allergic rhinitis   • Crohn's colitis (CMS/HCC)   • Gastroesophageal reflux disease without esophagitis   •  ASD (atrial septal defect)   • Elevated LFTs   • Impaired fasting glucose   • Family history of thyroid cancer   • Family history of breast cancer       Past Medical History:   Diagnosis Date   • Allergic rhinitis 2019   • Class 2 obesity due to excess calories with body mass index (BMI) of 36.0 to 36.9 in adult 2019   • Crohn's colitis (CMS/HCC)    • Elevated LFTs 3/6/2019   • Gastroesophageal reflux disease without esophagitis 2019   • Impaired fasting glucose 3/6/2019   • Major depressive disorder 2019       Past Surgical History:   Procedure Laterality Date   •  SECTION     • COLONOSCOPY     • TONSILLECTOMY         Family History   Problem Relation Age of Onset   • Breast cancer Biological Mother    • Hodgkin's lymphoma Biological Mother    • Thyroid cancer Biological Mother    • Leukemia Biological Father        Social History     Socioeconomic History   • Marital status:      Spouse name: Not on file   • Number of children: Not on file   • Years of education: Not on file   • Highest education level: Not on file   Occupational History   • Not on file   Social Needs   • Financial resource strain: Not on file   • Food insecurity     Worry: Not on file     Inability: Not on file   • Transportation needs     Medical: Not on file     Non-medical: Not on file   Tobacco Use   • Smoking status: Current Every Day Smoker   • Smokeless tobacco: Never Used   Substance and Sexual Activity   • Alcohol use: Yes     Comment: socially   • Drug use: No   • Sexual activity: Not on file   Lifestyle   • Physical activity     Days per week: Not on file     Minutes per session: Not on file   • Stress: Not on file   Relationships   • Social connections     Talks on phone: Not on file     Gets together: Not on file     Attends Congregational service: Not on file     Active member of club or organization: Not on file     Attends meetings of clubs or organizations: Not on file     Relationship status:  "Not on file   • Intimate partner violence     Fear of current or ex partner: Not on file     Emotionally abused: Not on file     Physically abused: Not on file     Forced sexual activity: Not on file   Other Topics Concern   • Not on file   Social History Narrative   • Not on file       Allergies   Allergen Reactions   • Penicillins Rash       Review of Systems   Constitutional: Negative for unexpected weight change.   HENT: Negative for trouble swallowing.    Eyes: Negative for visual disturbance.   Respiratory: Negative for shortness of breath.    Cardiovascular: Negative for chest pain, palpitations and leg swelling.   Gastrointestinal: Negative for abdominal pain (Only rarely if she has slight Crohn's flare).   Endocrine: Negative for polyuria.   Genitourinary: Negative for frequency.   Musculoskeletal: Negative for joint swelling.   Skin: Negative for rash.   Allergic/Immunologic: Negative for immunocompromised state.   Neurological: Negative for weakness.   Hematological: Negative for adenopathy.   Psychiatric/Behavioral: Negative for dysphoric mood.       Vitals:    03/01/21 1437   BP: 128/86   BP Location: Right upper arm   Patient Position: Sitting   Pulse: 64   Resp: 16   Temp: 36.9 °C (98.4 °F)   TempSrc: Oral   SpO2: 98%   Weight: 99.8 kg (220 lb)   Height: 1.626 m (5' 4\")     Body mass index is 37.76 kg/m².    Physical Exam  Vitals signs and nursing note reviewed.   Constitutional:       Appearance: She is well-developed.   HENT:      Head: Normocephalic.      Right Ear: External ear normal.      Left Ear: External ear normal.   Eyes:      Conjunctiva/sclera: Conjunctivae normal.   Neck:      Musculoskeletal: Normal range of motion.      Thyroid: No thyromegaly.      Trachea: No tracheal deviation.   Cardiovascular:      Rate and Rhythm: Normal rate and regular rhythm.      Heart sounds: Normal heart sounds. No murmur. No friction rub. No gallop.    Pulmonary:      Effort: Pulmonary effort is normal.    "   Breath sounds: Normal breath sounds. No wheezing or rales.   Abdominal:      General: Bowel sounds are normal. There is no distension.      Palpations: Abdomen is soft.      Tenderness: There is no abdominal tenderness.   Musculoskeletal: Normal range of motion.   Lymphadenopathy:      Cervical: No cervical adenopathy.   Skin:     Findings: No rash.   Psychiatric:         Behavior: Behavior normal.         Thought Content: Thought content normal.         Judgment: Judgment normal.         Problem List Items Addressed This Visit     Major depressive disorder     She will see if symptoms improve with more exercise and goal of weight loss along with her current medication dose but if not improving consider increased dose.         Relevant Medications    FLUoxetine (PROzac) 10 mg capsule    Impaired fasting glucose     Discussed working on decreasing carbs, sugars in her diet and working on regular exercise.  Check fasting labs.         Relevant Orders    Hemoglobin A1c    Gastroesophageal reflux disease without esophagitis     Controlled with current medication.         Family history of thyroid cancer    Relevant Orders    TSH w reflex FT4    ULTRASOUND THYROID    Elevated LFTs     Will have follow-up labs.         Crohn's colitis (CMS/HCC)     Controlled with current medication.         Relevant Orders    Comprehensive metabolic panel    Class 2 obesity due to excess calories with body mass index (BMI) of 36.0 to 36.9 in adult     Strategies directed toward weight loss discussed.         ASD (atrial septal defect)      Other Visit Diagnoses     Encounter for general adult medical examination without abnormal findings    -  Primary    Discussed trying to work on motivational factors to increase exercise, work on healthier diet, have goal of weight loss.    Relevant Orders    Comprehensive metabolic panel    CBC and Differential    Lipid Prof w Refl          Return in about 1 year (around 3/1/2022) for Physical  exam.          Jessica Goodman MD  [facility]  Family Medicine in [place]  [address],  [address]  [place]  [address]  P:   F: [phone]

## 2021-03-02 NOTE — ASSESSMENT & PLAN NOTE
She will see if symptoms improve with more exercise and goal of weight loss along with her current medication dose but if not improving consider increased dose.

## 2021-03-02 NOTE — ASSESSMENT & PLAN NOTE
Discussed working on decreasing carbs, sugars in her diet and working on regular exercise.  Check fasting labs.

## 2021-03-16 LAB
ALBUMIN SERPL-MCNC: 4.8 G/DL (ref 3.8–4.8)
ALBUMIN/GLOB SERPL: 1.7 {RATIO} (ref 1.2–2.2)
ALP SERPL-CCNC: 75 IU/L (ref 39–117)
ALT SERPL-CCNC: 30 IU/L (ref 0–32)
AST SERPL-CCNC: 23 IU/L (ref 0–40)
BASOPHILS # BLD AUTO: 0 X10E3/UL (ref 0–0.2)
BASOPHILS NFR BLD AUTO: 1 %
BILIRUB SERPL-MCNC: 0.4 MG/DL (ref 0–1.2)
BUN SERPL-MCNC: 12 MG/DL (ref 6–24)
BUN/CREAT SERPL: 17 (ref 9–23)
CALCIUM SERPL-MCNC: 9.3 MG/DL (ref 8.7–10.2)
CHLORIDE SERPL-SCNC: 101 MMOL/L (ref 96–106)
CHOLEST SERPL-MCNC: 195 MG/DL (ref 100–199)
CO2 SERPL-SCNC: 27 MMOL/L (ref 20–29)
CREAT SERPL-MCNC: 0.7 MG/DL (ref 0.57–1)
EOSINOPHIL # BLD AUTO: 0.1 X10E3/UL (ref 0–0.4)
EOSINOPHIL NFR BLD AUTO: 3 %
ERYTHROCYTE [DISTWIDTH] IN BLOOD BY AUTOMATED COUNT: 12.7 % (ref 11.7–15.4)
GLOBULIN SER CALC-MCNC: 2.9 G/DL (ref 1.5–4.5)
GLUCOSE SERPL-MCNC: 112 MG/DL (ref 65–99)
HBA1C MFR BLD: 5.5 % (ref 4.8–5.6)
HCT VFR BLD AUTO: 40.3 % (ref 34–46.6)
HDLC SERPL-MCNC: 68 MG/DL
HGB BLD-MCNC: 13.7 G/DL (ref 11.1–15.9)
IMM GRANULOCYTES # BLD AUTO: 0 X10E3/UL (ref 0–0.1)
IMM GRANULOCYTES NFR BLD AUTO: 0 %
LAB CORP EGFR IF AFRICN AM: 118 ML/MIN/1.73
LAB CORP EGFR IF NONAFRICN AM: 102 ML/MIN/1.73
LDLC SERPL CALC-MCNC: 108 MG/DL (ref 0–99)
LDLC/HDLC SERPL: 1.6 RATIO (ref 0–3.2)
LYMPHOCYTES # BLD AUTO: 1.8 X10E3/UL (ref 0.7–3.1)
LYMPHOCYTES NFR BLD AUTO: 42 %
MCH RBC QN AUTO: 32 PG (ref 26.6–33)
MCHC RBC AUTO-ENTMCNC: 34 G/DL (ref 31.5–35.7)
MCV RBC AUTO: 94 FL (ref 79–97)
MONOCYTES # BLD AUTO: 0.5 X10E3/UL (ref 0.1–0.9)
MONOCYTES NFR BLD AUTO: 11 %
NEUTROPHILS # BLD AUTO: 1.9 X10E3/UL (ref 1.4–7)
NEUTROPHILS NFR BLD AUTO: 43 %
PLATELET # BLD AUTO: 218 X10E3/UL (ref 150–450)
POTASSIUM SERPL-SCNC: 4.4 MMOL/L (ref 3.5–5.2)
PROT SERPL-MCNC: 7.7 G/DL (ref 6–8.5)
RBC # BLD AUTO: 4.28 X10E6/UL (ref 3.77–5.28)
SODIUM SERPL-SCNC: 139 MMOL/L (ref 134–144)
TRIGL SERPL-MCNC: 106 MG/DL (ref 0–149)
VLDLC SERPL CALC-MCNC: 19 MG/DL (ref 5–40)
WBC # BLD AUTO: 4.4 X10E3/UL (ref 3.4–10.8)

## 2021-03-17 LAB
T4 FREE SERPL-MCNC: 1.01 NG/DL (ref 0.82–1.77)
TSH SERPL DL<=0.005 MIU/L-ACNC: 3.3 UIU/ML (ref 0.45–4.5)

## 2021-03-31 ENCOUNTER — HOSPITAL ENCOUNTER (OUTPATIENT)
Dept: RADIOLOGY | Age: 51
Discharge: HOME | End: 2021-03-31
Attending: FAMILY MEDICINE
Payer: COMMERCIAL

## 2021-03-31 DIAGNOSIS — Z80.8 FAMILY HISTORY OF THYROID CANCER: ICD-10-CM

## 2021-03-31 PROCEDURE — 76536 US EXAM OF HEAD AND NECK: CPT

## 2021-04-02 ENCOUNTER — TELEPHONE (OUTPATIENT)
Dept: FAMILY MEDICINE | Facility: CLINIC | Age: 51
End: 2021-04-02

## 2021-04-02 NOTE — TELEPHONE ENCOUNTER
4/2 @ 11:45AM- LM for patient in regards to thyroid US. C/B if she has any questions or concerns.

## 2021-04-05 ENCOUNTER — TELEPHONE (OUTPATIENT)
Dept: FAMILY MEDICINE | Facility: CLINIC | Age: 51
End: 2021-04-05

## 2021-04-05 DIAGNOSIS — E04.1 THYROID NODULE: ICD-10-CM

## 2021-04-05 DIAGNOSIS — Z86.39 PERSONAL HISTORY OF ENDOCRINE DISORDER: Primary | ICD-10-CM

## 2021-04-05 NOTE — TELEPHONE ENCOUNTER
Dr. Goodman- your thoughts on this? The test was already done but might not be covered with Family hx of thyroid CA dx code.

## 2021-04-05 NOTE — TELEPHONE ENCOUNTER
Received a call from imaging and they cannot use Z80.8 as a primary DX code for the patients US of the neck.  Please revise and let Petty hatfield @ [phone] - thx   Right hip with Aquacel dressing in place, dry and intact. Verbalized still with some decreased sensation to right anterior thigh. Denied having right hip pain.   Complaint mild lightheaded getting up with OT this morning, BP was low but able to tolerate g

## 2021-04-06 NOTE — TELEPHONE ENCOUNTER
Left detailed VM for Petty informing her that US was reordered. Asked her to c/b if she has any questions or needs further assistance.

## 2021-04-15 DIAGNOSIS — Z23 ENCOUNTER FOR IMMUNIZATION: ICD-10-CM

## 2021-07-06 ENCOUNTER — TELEPHONE (OUTPATIENT)
Dept: FAMILY MEDICINE | Facility: CLINIC | Age: 51
End: 2021-07-06

## 2021-07-06 NOTE — TELEPHONE ENCOUNTER
Patient called stating that she is in [place] on vacation and has a sinus infection that started last week before the holiday weekend. Patient is requesting a Z-pack to be sent to the local [facility] in [place]. The phone number is [phone]. Please advise.

## 2021-07-06 NOTE — TELEPHONE ENCOUNTER
We cannot send in antibiotics. She will need to be evaluated at urgent care near her. Please let her know

## 2022-06-06 ENCOUNTER — OFFICE VISIT (OUTPATIENT)
Dept: FAMILY MEDICINE | Facility: CLINIC | Age: 52
End: 2022-06-06
Payer: COMMERCIAL

## 2022-06-06 VITALS
WEIGHT: 219.4 LBS | OXYGEN SATURATION: 98 % | BODY MASS INDEX: 38.88 KG/M2 | DIASTOLIC BLOOD PRESSURE: 90 MMHG | TEMPERATURE: 97.1 F | SYSTOLIC BLOOD PRESSURE: 150 MMHG | RESPIRATION RATE: 16 BRPM | HEIGHT: 63 IN | HEART RATE: 59 BPM

## 2022-06-06 DIAGNOSIS — Z12.31 ENCOUNTER FOR SCREENING MAMMOGRAM FOR MALIGNANT NEOPLASM OF BREAST: ICD-10-CM

## 2022-06-06 DIAGNOSIS — Z00.00 ENCOUNTER FOR GENERAL ADULT MEDICAL EXAMINATION WITHOUT ABNORMAL FINDINGS: Primary | ICD-10-CM

## 2022-06-06 DIAGNOSIS — E66.09 CLASS 2 OBESITY DUE TO EXCESS CALORIES WITHOUT SERIOUS COMORBIDITY WITH BODY MASS INDEX (BMI) OF 36.0 TO 36.9 IN ADULT: ICD-10-CM

## 2022-06-06 DIAGNOSIS — K50.10 CROHN'S DISEASE OF COLON WITHOUT COMPLICATION (CMS/HCC): ICD-10-CM

## 2022-06-06 DIAGNOSIS — R03.0 ELEVATED BP WITHOUT DIAGNOSIS OF HYPERTENSION: ICD-10-CM

## 2022-06-06 DIAGNOSIS — E66.812 CLASS 2 OBESITY DUE TO EXCESS CALORIES WITHOUT SERIOUS COMORBIDITY WITH BODY MASS INDEX (BMI) OF 36.0 TO 36.9 IN ADULT: ICD-10-CM

## 2022-06-06 DIAGNOSIS — R73.01 IMPAIRED FASTING GLUCOSE: ICD-10-CM

## 2022-06-06 DIAGNOSIS — Q21.10 ASD (ATRIAL SEPTAL DEFECT): ICD-10-CM

## 2022-06-06 PROCEDURE — 99396 PREV VISIT EST AGE 40-64: CPT | Performed by: FAMILY MEDICINE

## 2022-06-06 PROCEDURE — 3008F BODY MASS INDEX DOCD: CPT | Performed by: FAMILY MEDICINE

## 2022-06-06 ASSESSMENT — ENCOUNTER SYMPTOMS
JOINT SWELLING: 0
UNEXPECTED WEIGHT CHANGE: 0
WEAKNESS: 0
DYSPHORIC MOOD: 0
ABDOMINAL PAIN: 0
SHORTNESS OF BREATH: 0
DIFFICULTY URINATING: 0
BRUISES/BLEEDS EASILY: 0

## 2022-06-06 ASSESSMENT — PAIN SCALES - GENERAL: PAINLEVEL: 0-NO PAIN

## 2022-06-06 ASSESSMENT — PATIENT HEALTH QUESTIONNAIRE - PHQ9: SUM OF ALL RESPONSES TO PHQ9 QUESTIONS 1 & 2: 0

## 2022-06-06 NOTE — ASSESSMENT & PLAN NOTE
Try to focus on keeping sugars and carbohydrate lower in diet and getting regular exercise.  Check fasting labs.

## 2022-06-06 NOTE — PROGRESS NOTES
Chief Complaint  Chief Complaint   Patient presents with   • Annual Exam     PE, due for colon by Tish Cosme. Mammo by Dr Pool, Axia also due.        History of Present Illness  50 yo female, here for PE.  Had first Shingrix and will have second soon.  Will also get COVID 19 4th dose.  Tdap up-to-date.  Has colonoscopy and upper endo due to GERD scheduled to be done by Dr Garcia.  Crohn's has been under good control on medication.  Taking Prozac w/o problems.  She had tried to go off it at times but had flaring of symptoms and wants to continue current dose.  Disc osteoporosis risk on meds and exercise as not getting much.  Due for mammogram.  Trying to see new gyn.   Has not been having success with weight loss and we discussed strategies.  Has not been getting much exercise.           Current Outpatient Medications   Medication Sig Dispense Refill   • FLUoxetine (PROzac) 10 mg capsule TAKE 1 CAPSULE BY MOUTH EVERY DAY 30 capsule 2   • omeprazole (PriLOSEC) 20 mg capsule Take 1 capsule (20 mg total) by mouth daily before breakfast.     • sulfaSALAzine (AZULFIDINE) 500 mg tablet Take 1 tablet (500 mg total) by mouth 4 (four) times a day.     • Lactobac 40-Bifido 3-S.thermop 100 billion cell capsule Take by mouth.       No current facility-administered medications for this visit.       Patient Active Problem List   Diagnosis   • Class 2 obesity due to excess calories with body mass index (BMI) of 36.0 to 36.9 in adult   • Major depressive disorder   • Allergic rhinitis   • Crohn's colitis (CMS/HCC)   • Gastroesophageal reflux disease without esophagitis   • ASD (atrial septal defect)   • Elevated LFTs   • Impaired fasting glucose   • Family history of thyroid cancer   • Family history of breast cancer   • Thyroid nodule       Past Medical History:   Diagnosis Date   • Allergic rhinitis 2/18/2019   • Class 2 obesity due to excess calories with body mass index (BMI) of 36.0 to 36.9 in adult 2/18/2019   •  Crohn's colitis (CMS/HCC)    • Elevated LFTs 3/6/2019   • Gastroesophageal reflux disease without esophagitis 2019   • Impaired fasting glucose 3/6/2019   • Major depressive disorder 2019       Past Surgical History:   Procedure Laterality Date   •  SECTION     • COLONOSCOPY     • TONSILLECTOMY         Family History   Problem Relation Age of Onset   • Breast cancer Biological Mother    • Hodgkin's lymphoma Biological Mother    • Thyroid cancer Biological Mother    • Leukemia Biological Father        Social History     Socioeconomic History   • Marital status:      Spouse name: Not on file   • Number of children: Not on file   • Years of education: Not on file   • Highest education level: Not on file   Occupational History   • Not on file   Tobacco Use   • Smoking status: Current Every Day Smoker     Packs/day: 0.50     Types: Cigarettes   • Smokeless tobacco: Never Used   Substance and Sexual Activity   • Alcohol use: Yes     Comment: socially   • Drug use: No   • Sexual activity: Not on file   Other Topics Concern   • Not on file   Social History Narrative   • Not on file     Social Determinants of Health     Financial Resource Strain: Not on file   Food Insecurity: Not on file   Transportation Needs: Not on file   Physical Activity: Not on file   Stress: Not on file   Social Connections: Not on file   Intimate Partner Violence: Not on file   Housing Stability: Not on file       Allergies   Allergen Reactions   • Penicillins Rash       Review of Systems   Constitutional: Negative for unexpected weight change.   HENT: Negative for hearing loss.    Eyes: Negative for visual disturbance.   Respiratory: Negative for shortness of breath.    Cardiovascular: Negative for chest pain and leg swelling.   Gastrointestinal: Negative for abdominal pain.   Endocrine: Negative for polyuria.   Genitourinary: Negative for difficulty urinating.   Musculoskeletal: Negative for joint swelling.   Skin:  "Negative for rash.   Allergic/Immunologic: Negative for immunocompromised state.   Neurological: Negative for weakness.   Hematological: Does not bruise/bleed easily.   Psychiatric/Behavioral: Negative for dysphoric mood.       Vitals:    06/06/22 0915   BP: (!) 150/90   BP Location: Right upper arm   Patient Position: Sitting   Pulse: (!) 59   Resp: 16   Temp: 36.2 °C (97.1 °F)   TempSrc: Temporal   SpO2: 98%   Weight: 99.5 kg (219 lb 6.4 oz)   Height: 1.6 m (5' 3\")     Body mass index is 38.86 kg/m².    Physical Exam  Vitals and nursing note reviewed.   Constitutional:       Appearance: She is well-developed.   HENT:      Head: Normocephalic.      Right Ear: Tympanic membrane, ear canal and external ear normal.      Left Ear: Tympanic membrane, ear canal and external ear normal.   Eyes:      Conjunctiva/sclera: Conjunctivae normal.   Neck:      Thyroid: No thyromegaly.      Trachea: No tracheal deviation.   Cardiovascular:      Rate and Rhythm: Normal rate and regular rhythm.      Heart sounds: Normal heart sounds. No murmur heard.    No gallop.   Pulmonary:      Effort: Pulmonary effort is normal.      Breath sounds: Normal breath sounds. No wheezing or rales.   Abdominal:      General: Bowel sounds are normal. There is no distension.      Palpations: Abdomen is soft.      Tenderness: There is no abdominal tenderness.   Musculoskeletal:      Right lower leg: No edema.      Left lower leg: No edema.   Lymphadenopathy:      Cervical: No cervical adenopathy.   Skin:     Findings: No rash.   Neurological:      General: No focal deficit present.      Mental Status: She is alert.   Psychiatric:         Behavior: Behavior normal.         Thought Content: Thought content normal.         Judgment: Judgment normal.         Problem List Items Addressed This Visit     Impaired fasting glucose     Try to focus on keeping sugars and carbohydrate lower in diet and getting regular exercise.  Check fasting labs.           " Relevant Orders    Hemoglobin A1c    TSH w reflex FT4    Crohn's colitis (CMS/HCC)     Has had good control with current medication and following with GI closely.  Colonoscopy pending.           Class 2 obesity due to excess calories with body mass index (BMI) of 36.0 to 36.9 in adult     Goal of weight loss discussed.           ASD (atrial septal defect)     Stable without symptoms.             Other Visit Diagnoses     Encounter for general adult medical examination without abnormal findings    -  Primary    Discussed regular aerobic exercise, healthy diet with calorie restriction, check fasting labs.    Relevant Orders    Comprehensive metabolic panel    CBC and Differential    Lipid Prof w Refl    Elevated BP without diagnosis of hypertension        Try working on low-sodium, weight loss, regular exercise, recheck 8 weeks.  May need medication if BP still elevated.    Relevant Orders    Urinalysis with microscopic    Encounter for screening mammogram for malignant neoplasm of breast        Relevant Orders    BI SCREENING MAMMOGRAM BILATERAL(TOMOSYNTHESIS)          Return in about 8 weeks (around 8/1/2022) for Next scheduled follow-up.          Jessica Goodman MD  [facility]  Family Medicine in [place]  [address],  [address]  [place]  [address]  P: [phone]  F: [phone]

## 2022-06-14 LAB
ALBUMIN SERPL-MCNC: 4.1 G/DL (ref 3.6–5.1)
ALBUMIN/GLOB SERPL: 1.8 (CALC) (ref 1–2.5)
ALP SERPL-CCNC: 56 U/L (ref 37–153)
ALT SERPL-CCNC: 37 U/L (ref 6–29)
APPEARANCE UR: CLEAR
AST SERPL-CCNC: 22 U/L (ref 10–35)
BACTERIA #/AREA URNS HPF: ABNORMAL /HPF
BASOPHILS # BLD AUTO: 31 CELLS/UL (ref 0–200)
BASOPHILS NFR BLD AUTO: 0.6 %
BILIRUB SERPL-MCNC: 0.4 MG/DL (ref 0.2–1.2)
BILIRUB UR QL STRIP: NEGATIVE
BUN SERPL-MCNC: 16 MG/DL (ref 7–25)
BUN/CREAT SERPL: ABNORMAL (CALC) (ref 6–22)
CALCIUM SERPL-MCNC: 8.7 MG/DL (ref 8.6–10.4)
CHLORIDE SERPL-SCNC: 105 MMOL/L (ref 98–110)
CHOLEST SERPL-MCNC: 178 MG/DL
CHOLEST/HDLC SERPL: 3 (CALC)
CO2 SERPL-SCNC: 29 MMOL/L (ref 20–32)
COLOR UR: YELLOW
CREAT SERPL-MCNC: 0.58 MG/DL (ref 0.5–1.05)
EOSINOPHIL # BLD AUTO: 92 CELLS/UL (ref 15–500)
EOSINOPHIL NFR BLD AUTO: 1.8 %
ERYTHROCYTE [DISTWIDTH] IN BLOOD BY AUTOMATED COUNT: 12.8 % (ref 11–15)
GLOBULIN SER CALC-MCNC: 2.3 G/DL (CALC) (ref 1.9–3.7)
GLUCOSE SERPL-MCNC: 104 MG/DL (ref 65–99)
GLUCOSE UR QL STRIP: NEGATIVE
HBA1C MFR BLD: 5.5 % OF TOTAL HGB
HCT VFR BLD AUTO: 38.4 % (ref 35–45)
HDLC SERPL-MCNC: 60 MG/DL
HGB BLD-MCNC: 12.8 G/DL (ref 11.7–15.5)
HGB UR QL STRIP: NEGATIVE
HYALINE CASTS #/AREA URNS LPF: ABNORMAL /LPF
KETONES UR QL STRIP: NEGATIVE
LDLC SERPL CALC-MCNC: 99 MG/DL (CALC)
LEUKOCYTE ESTERASE UR QL STRIP: NEGATIVE
LYMPHOCYTES # BLD AUTO: 2321 CELLS/UL (ref 850–3900)
LYMPHOCYTES NFR BLD AUTO: 45.5 %
MCH RBC QN AUTO: 31.7 PG (ref 27–33)
MCHC RBC AUTO-ENTMCNC: 33.3 G/DL (ref 32–36)
MCV RBC AUTO: 95 FL (ref 80–100)
MONOCYTES # BLD AUTO: 490 CELLS/UL (ref 200–950)
MONOCYTES NFR BLD AUTO: 9.6 %
NEUTROPHILS # BLD AUTO: 2168 CELLS/UL (ref 1500–7800)
NEUTROPHILS NFR BLD AUTO: 42.5 %
NITRITE UR QL STRIP: NEGATIVE
NONHDLC SERPL-MCNC: 118 MG/DL (CALC)
PH UR STRIP: 5.5 [PH] (ref 5–8)
PLATELET # BLD AUTO: 208 THOUSAND/UL (ref 140–400)
PMV BLD REES-ECKER: 11.5 FL (ref 7.5–12.5)
POTASSIUM SERPL-SCNC: 4.8 MMOL/L (ref 3.5–5.3)
PROT SERPL-MCNC: 6.4 G/DL (ref 6.1–8.1)
PROT UR QL STRIP: NEGATIVE
RBC # BLD AUTO: 4.04 MILLION/UL (ref 3.8–5.1)
RBC #/AREA URNS HPF: ABNORMAL /HPF
SODIUM SERPL-SCNC: 139 MMOL/L (ref 135–146)
SP GR UR STRIP: 1.02 (ref 1–1.03)
SQUAMOUS #/AREA URNS HPF: ABNORMAL /HPF
TRIGL SERPL-MCNC: 98 MG/DL
TSH SERPL-ACNC: 3.4 MIU/L
WBC # BLD AUTO: 5.1 THOUSAND/UL (ref 3.8–10.8)
WBC #/AREA URNS HPF: ABNORMAL /HPF

## 2022-06-30 DIAGNOSIS — F32.9 MAJOR DEPRESSIVE DISORDER WITH CURRENT ACTIVE EPISODE, UNSPECIFIED DEPRESSION EPISODE SEVERITY, UNSPECIFIED WHETHER RECURRENT: ICD-10-CM

## 2022-06-30 RX ORDER — FLUOXETINE 10 MG/1
10 CAPSULE ORAL
Qty: 90 CAPSULE | Refills: 0 | Status: SHIPPED | OUTPATIENT
Start: 2022-06-30 | End: 2022-10-17

## 2022-06-30 NOTE — TELEPHONE ENCOUNTER
Medicine Refill Request    Last Office: 6/6/2022   Last Consult Visit: Visit date not found  Last Telemedicine Visit: Visit date not found    Next Appointment: Visit date not found      Current Outpatient Medications:   •  FLUoxetine (PROzac) 10 mg capsule, TAKE 1 CAPSULE BY MOUTH EVERY DAY, Disp: 30 capsule, Rfl: 2  •  Lactobac 40-Bifido 3-S.thermop 100 billion cell capsule, Take by mouth., Disp: , Rfl:   •  omeprazole (PriLOSEC) 20 mg capsule, Take 1 capsule (20 mg total) by mouth daily before breakfast., Disp: , Rfl:   •  sulfaSALAzine (AZULFIDINE) 500 mg tablet, Take 1 tablet (500 mg total) by mouth 4 (four) times a day., Disp: , Rfl:       BP Readings from Last 3 Encounters:   06/06/22 (!) 150/90   03/01/21 128/86   02/25/20 138/85       Recent Lab results:  Lab Results   Component Value Date    CHOL 178 06/13/2022   ,   Lab Results   Component Value Date    HDL 60 06/13/2022   ,   Lab Results   Component Value Date    LDLCALC 99 06/13/2022   ,   Lab Results   Component Value Date    TRIG 98 06/13/2022        Lab Results   Component Value Date    GLUCOSE 104 (H) 06/13/2022    GLUCOSE NEGATIVE 06/13/2022   ,   Lab Results   Component Value Date    HGBA1C 5.5 06/13/2022         Lab Results   Component Value Date    CREATININE 0.58 06/13/2022       Lab Results   Component Value Date    TSH 3.40 06/13/2022

## 2022-09-20 LAB — HM MAMMOGRAM: NORMAL

## 2022-10-03 LAB — HM COLONOSCOPY: NORMAL

## 2022-10-17 DIAGNOSIS — F32.9 MAJOR DEPRESSIVE DISORDER WITH CURRENT ACTIVE EPISODE, UNSPECIFIED DEPRESSION EPISODE SEVERITY, UNSPECIFIED WHETHER RECURRENT: ICD-10-CM

## 2022-10-17 RX ORDER — FLUOXETINE 10 MG/1
CAPSULE ORAL
Qty: 90 CAPSULE | Refills: 0 | Status: SHIPPED | OUTPATIENT
Start: 2022-10-17 | End: 2022-12-12

## 2022-11-02 ENCOUNTER — HOSPITAL ENCOUNTER (OUTPATIENT)
Dept: RADIOLOGY | Age: 52
Discharge: HOME | End: 2022-11-02
Attending: NURSE PRACTITIONER
Payer: COMMERCIAL

## 2022-11-02 ENCOUNTER — OFFICE VISIT (OUTPATIENT)
Dept: FAMILY MEDICINE | Facility: CLINIC | Age: 52
End: 2022-11-02
Payer: COMMERCIAL

## 2022-11-02 VITALS
OXYGEN SATURATION: 97 % | HEIGHT: 63 IN | SYSTOLIC BLOOD PRESSURE: 140 MMHG | WEIGHT: 220 LBS | DIASTOLIC BLOOD PRESSURE: 90 MMHG | TEMPERATURE: 97.3 F | HEART RATE: 70 BPM | BODY MASS INDEX: 38.98 KG/M2

## 2022-11-02 DIAGNOSIS — R05.1 ACUTE COUGH: ICD-10-CM

## 2022-11-02 DIAGNOSIS — J11.1 INFLUENZA: Primary | ICD-10-CM

## 2022-11-02 PROCEDURE — 3008F BODY MASS INDEX DOCD: CPT | Performed by: NURSE PRACTITIONER

## 2022-11-02 PROCEDURE — 99214 OFFICE O/P EST MOD 30 MIN: CPT | Performed by: NURSE PRACTITIONER

## 2022-11-02 PROCEDURE — 71046 X-RAY EXAM CHEST 2 VIEWS: CPT

## 2022-11-02 RX ORDER — AZELASTINE HCL 205.5 UG/1
1 SPRAY NASAL 2 TIMES DAILY PRN
Qty: 30 ML | Refills: 1 | Status: SHIPPED | OUTPATIENT
Start: 2022-11-02 | End: 2023-05-01

## 2022-11-02 RX ORDER — AZITHROMYCIN 250 MG/1
TABLET, FILM COATED ORAL
Qty: 6 TABLET | Refills: 0 | Status: SHIPPED | OUTPATIENT
Start: 2022-11-02 | End: 2022-11-07

## 2022-11-02 RX ORDER — PROMETHAZINE HYDROCHLORIDE AND DEXTROMETHORPHAN HYDROBROMIDE 6.25; 15 MG/5ML; MG/5ML
5 SYRUP ORAL EVERY 4 HOURS PRN
Qty: 180 ML | Refills: 0 | Status: SHIPPED | OUTPATIENT
Start: 2022-11-02 | End: 2022-11-12

## 2022-11-02 ASSESSMENT — ENCOUNTER SYMPTOMS
HEADACHES: 1
FATIGUE: 1
ABDOMINAL PAIN: 0
PALPITATIONS: 0
DIARRHEA: 1
WHEEZING: 0
COUGH: 1
LIGHT-HEADEDNESS: 0
FEVER: 1
CONSTIPATION: 0
NAUSEA: 1
SHORTNESS OF BREATH: 1
VOMITING: 0
SORE THROAT: 1
ROS GI COMMENTS: DECREASED APPETITE
CHILLS: 1

## 2022-11-02 NOTE — PATIENT INSTRUCTIONS
Likely exposure to flu, pt's colleague with whom she was travelling last week tested positive for Flu A on Saturday. Day 7, so out of the Tamiflu window. Symtpms management, mild turbinate erythema, Zpack. Phenergan DM, Astepro. CXR for persistent cough.     Follow up if not improving.

## 2022-11-02 NOTE — PROGRESS NOTES
[facility]  [address] [place] [address]  [phone]     Reason for visit:   Chief Complaint   Patient presents with    Follow-up     Ever, cough, headache, chest congestion started on 10/21. All covid tests were negative. Her coworker was positive for influenza A at the time. She doesn't feel well and still has sinus pressure and a cough.      HPI   Lima Gutierrez is a 52 y.o. female who presents with likely flu, pt was trvelling last week with a colleague whi testeed positive for Flu A on Saturday. Fever, TMax 102.5, body aches, lack of appetite. Pt has tested negative for covid x 3 at home        Medical History:  Past Medical History:   Diagnosis Date    Allergic rhinitis 2019    Class 2 obesity due to excess calories with body mass index (BMI) of 36.0 to 36.9 in adult 2019    Crohn's colitis (CMS/HCC)     Elevated LFTs 3/6/2019    Gastroesophageal reflux disease without esophagitis 2019    Impaired fasting glucose 3/6/2019    Major depressive disorder 2019       Surgical History:  Past Surgical History:   Procedure Laterality Date     SECTION      COLONOSCOPY  2017    TONSILLECTOMY         Social History:  Social History     Social History Narrative    Not on file       Family History:  Family History   Problem Relation Age of Onset    Breast cancer Biological Mother     Hodgkin's lymphoma Biological Mother     Thyroid cancer Biological Mother     Leukemia Biological Father        Allergies:  Penicillins    Current Medications:  Current Outpatient Medications   Medication Sig Dispense Refill    azelastine 205.5 mcg (0.15 %) nasal spray Administer 1 spray into each nostril 2 (two) times a day as needed for rhinitis. 30 mL 1    azithromycin (ZITHROMAX) 250 mg tablet Take 2 tablets the first day, then 1 tablet daily for 4 days. 6 tablet 0    FLUoxetine (PROzac) 10 mg capsule TAKE 1 CAPSULE BY MOUTH EVERY DAY 90 capsule 0     omeprazole (PriLOSEC) 20 mg capsule Take 1 capsule (20 mg total) by mouth daily before breakfast.      promethazine-DM (PROMETHAZINE-DM) 6.25-15 mg/5 mL syrup Take 5 mL by mouth every 4 (four) hours as needed for cough for up to 10 days. 180 mL 0    sulfaSALAzine (AZULFIDINE) 500 mg tablet Take 1 tablet (500 mg total) by mouth 4 (four) times a day.      Lactobac 40-Bifido 3-S.thermop 100 billion cell capsule Take by mouth.       No current facility-administered medications for this visit.       Review of Systems:  Review of Systems   Constitutional: Positive for chills, fatigue and fever.   HENT: Positive for congestion and sore throat.    Respiratory: Positive for cough (mostly dry) and shortness of breath (mild GASTELUM). Negative for wheezing.    Cardiovascular: Negative for chest pain and palpitations.   Gastrointestinal: Positive for diarrhea (now resolved) and nausea (now resolved, at onset). Negative for abdominal pain, constipation and vomiting.        Decreased appetite   Neurological: Positive for headaches. Negative for light-headedness.       Objective     Vital Signs:  Vitals:    11/02/22 1034   BP: 140/90   Pulse: 70   Temp: 36.3 °C (97.3 °F)   SpO2: 97%       BMI:  Body mass index is 38.97 kg/m².     Physical Exam  Constitutional:       Appearance: Normal appearance.   HENT:      Head: Normocephalic and atraumatic.      Nose:      Right Turbinates: Enlarged.      Left Turbinates: Enlarged.      Comments: Mildly erythematous turbinates  Cardiovascular:      Rate and Rhythm: Normal rate and regular rhythm.      Heart sounds: Normal heart sounds.   Pulmonary:      Effort: Pulmonary effort is normal.      Breath sounds: Normal breath sounds.   Neurological:      General: No focal deficit present.      Mental Status: She is alert and oriented to person, place, and time.   Psychiatric:         Attention and Perception: Attention and perception normal.         Mood and Affect: Mood and affect normal.          Speech: Speech normal.         Behavior: Behavior normal. Behavior is cooperative.         Thought Content: Thought content normal.         Cognition and Memory: Cognition normal.         Judgment: Judgment normal.         Recent labs before today:     Lab Results   Component Value Date    WBC 5.1 06/13/2022    HGB 12.8 06/13/2022    HCT 38.4 06/13/2022     06/13/2022    CHOL 178 06/13/2022    TRIG 98 06/13/2022    HDL 60 06/13/2022    ALT 37 (H) 06/13/2022    AST 22 06/13/2022     06/13/2022    K 4.8 06/13/2022     06/13/2022    CREATININE 0.58 06/13/2022    BUN 16 06/13/2022    CO2 29 06/13/2022    TSH 3.40 06/13/2022    HGBA1C 5.5 06/13/2022        Procedures   Assessment     Patient Instructions   Likely exposure to flu, pt's colleague with whom she was travelling last week tested positive for Flu A on Saturday. Day 7, so out of the Tamiflu window. Symtpms management, mild turbinate erythema, Zpack. Phenergan DM, Astepro. CXR for persistent cough.     Follow up if not improving.     [unfilled]  Problem List Items Addressed This Visit    None  Visit Diagnoses     Influenza    -  Primary    Acute cough        Relevant Orders    X-RAY CHEST 2 VIEWS             The total time spent ON THE DAY OF THE VISIT was 30 minutes, including preparing to see the patient, obtaining and reviewing separately obtained history, performing medically appropriate examination or evaluation, counseling and educating patient/family/caregiver, ordering medications, tests or procedures, referring and communicating with other health care professionals, documenting clinical information in electronic or other record, independently interpreting and communicating results to patient/family/caregiver, and/or care coordination.             Norris Pratt, SAMMI, MADIE  11/2/2022      This document was created using Dragon dictation software.  There might be some typographical errors due to this technology.

## 2022-12-12 DIAGNOSIS — F32.9 MAJOR DEPRESSIVE DISORDER WITH CURRENT ACTIVE EPISODE, UNSPECIFIED DEPRESSION EPISODE SEVERITY, UNSPECIFIED WHETHER RECURRENT: ICD-10-CM

## 2022-12-12 RX ORDER — FLUOXETINE 10 MG/1
CAPSULE ORAL
Qty: 90 CAPSULE | Refills: 0 | Status: SHIPPED | OUTPATIENT
Start: 2022-12-12 | End: 2023-02-23

## 2023-02-22 ENCOUNTER — TRANSCRIBE ORDERS (OUTPATIENT)
Dept: SCHEDULING | Age: 53
End: 2023-02-22

## 2023-02-22 ENCOUNTER — TRANSCRIBE ORDERS (OUTPATIENT)
Dept: ADMINISTRATIVE | Age: 53
End: 2023-02-22

## 2023-02-22 DIAGNOSIS — R10.31 RIGHT LOWER QUADRANT PAIN: ICD-10-CM

## 2023-02-22 DIAGNOSIS — K50.80 CROHN'S DISEASE OF BOTH SMALL AND LARGE INTESTINE WITHOUT COMPLICATIONS (CMS/HCC): Primary | ICD-10-CM

## 2023-02-23 DIAGNOSIS — F32.9 MAJOR DEPRESSIVE DISORDER WITH CURRENT ACTIVE EPISODE, UNSPECIFIED DEPRESSION EPISODE SEVERITY, UNSPECIFIED WHETHER RECURRENT: ICD-10-CM

## 2023-02-23 RX ORDER — FLUOXETINE 10 MG/1
CAPSULE ORAL
Qty: 90 CAPSULE | Refills: 1 | Status: SHIPPED | OUTPATIENT
Start: 2023-02-23 | End: 2024-02-26

## 2023-03-02 ENCOUNTER — HOSPITAL ENCOUNTER (OUTPATIENT)
Dept: RADIOLOGY | Facility: HOSPITAL | Age: 53
Discharge: HOME | End: 2023-03-02
Attending: INTERNAL MEDICINE
Payer: COMMERCIAL

## 2023-03-02 DIAGNOSIS — R10.31 RIGHT LOWER QUADRANT PAIN: ICD-10-CM

## 2023-03-02 DIAGNOSIS — K50.80 CROHN'S DISEASE OF BOTH SMALL AND LARGE INTESTINE WITHOUT COMPLICATIONS (CMS/HCC): ICD-10-CM

## 2023-03-02 RX ORDER — GADOBUTROL 604.72 MG/ML
0.1 INJECTION INTRAVENOUS ONCE
Status: COMPLETED | OUTPATIENT
Start: 2023-03-02 | End: 2023-03-02

## 2023-03-02 RX ADMIN — GADOBUTROL 9.3 ML: 604.72 INJECTION INTRAVENOUS at 08:42

## 2024-02-26 DIAGNOSIS — F32.9 MAJOR DEPRESSIVE DISORDER WITH CURRENT ACTIVE EPISODE, UNSPECIFIED DEPRESSION EPISODE SEVERITY, UNSPECIFIED WHETHER RECURRENT: ICD-10-CM

## 2024-02-26 RX ORDER — FLUOXETINE 10 MG/1
CAPSULE ORAL
Qty: 90 CAPSULE | Refills: 1 | Status: SHIPPED | OUTPATIENT
Start: 2024-02-26 | End: 2024-09-25 | Stop reason: SDUPTHER

## 2024-02-26 RX ORDER — FLUOXETINE 10 MG/1
CAPSULE ORAL DAILY
Status: CANCELLED | OUTPATIENT
Start: 2024-02-26

## 2024-04-24 ENCOUNTER — OFFICE VISIT (OUTPATIENT)
Dept: FAMILY MEDICINE | Facility: CLINIC | Age: 54
End: 2024-04-24
Payer: COMMERCIAL

## 2024-04-24 VITALS
BODY MASS INDEX: 39.83 KG/M2 | HEIGHT: 63 IN | OXYGEN SATURATION: 97 % | HEART RATE: 68 BPM | TEMPERATURE: 97.8 F | RESPIRATION RATE: 16 BRPM | WEIGHT: 224.8 LBS | DIASTOLIC BLOOD PRESSURE: 84 MMHG | SYSTOLIC BLOOD PRESSURE: 142 MMHG

## 2024-04-24 DIAGNOSIS — E66.812 CLASS 2 OBESITY DUE TO EXCESS CALORIES WITHOUT SERIOUS COMORBIDITY WITH BODY MASS INDEX (BMI) OF 36.0 TO 36.9 IN ADULT: ICD-10-CM

## 2024-04-24 DIAGNOSIS — R03.0 ELEVATED BLOOD PRESSURE READING IN OFFICE WITHOUT DIAGNOSIS OF HYPERTENSION: ICD-10-CM

## 2024-04-24 DIAGNOSIS — Q21.10 ASD (ATRIAL SEPTAL DEFECT): ICD-10-CM

## 2024-04-24 DIAGNOSIS — K50.10 CROHN'S DISEASE OF COLON WITHOUT COMPLICATION (CMS/HCC): ICD-10-CM

## 2024-04-24 DIAGNOSIS — F32.9 MAJOR DEPRESSIVE DISORDER WITH CURRENT ACTIVE EPISODE, UNSPECIFIED DEPRESSION EPISODE SEVERITY, UNSPECIFIED WHETHER RECURRENT: ICD-10-CM

## 2024-04-24 DIAGNOSIS — R06.83 SNORING: ICD-10-CM

## 2024-04-24 DIAGNOSIS — Z00.00 ENCOUNTER FOR GENERAL ADULT MEDICAL EXAMINATION WITHOUT ABNORMAL FINDINGS: Primary | ICD-10-CM

## 2024-04-24 DIAGNOSIS — E66.09 CLASS 2 OBESITY DUE TO EXCESS CALORIES WITHOUT SERIOUS COMORBIDITY WITH BODY MASS INDEX (BMI) OF 36.0 TO 36.9 IN ADULT: ICD-10-CM

## 2024-04-24 DIAGNOSIS — R73.01 IMPAIRED FASTING GLUCOSE: ICD-10-CM

## 2024-04-24 PROCEDURE — 3008F BODY MASS INDEX DOCD: CPT | Performed by: FAMILY MEDICINE

## 2024-04-24 PROCEDURE — 99396 PREV VISIT EST AGE 40-64: CPT | Performed by: FAMILY MEDICINE

## 2024-04-24 ASSESSMENT — ENCOUNTER SYMPTOMS
SHORTNESS OF BREATH: 0
UNEXPECTED WEIGHT CHANGE: 0
FREQUENCY: 0
JOINT SWELLING: 0
WEAKNESS: 0
DYSPHORIC MOOD: 0
ABDOMINAL PAIN: 0

## 2024-04-24 NOTE — PROGRESS NOTES
Chief Complaint  Chief Complaint   Patient presents with    Annual Exam     Pt here for PE and states that she is snoring at night and is req further workup for sleep apnea.   Pt also would like to discuss wegovy update for availability        History of Present Illness  52 yo female.  Here for PE.  Has been noticing that as she is gaining weight she is snoring more.   has noted that she does seem to stop breathing at times.  We discussed the implications of possible sleep apnea and having an evaluation and she is agreeable.  Discussed Wegovy, and similar GLP-1 agonists.  The supply problem is an issue currently.  Also, she was not aware that this would be a long-term commitment.  She had plan to maybe only use it a short period of time.  We did discuss the high likelihood of regaining weight when stopping the medication.  We also discussed the potential benefits of the medication and the benefit of weight loss.  We did review the risks and she might want to investigate coverage and cost.  Once supply improves, this could be a possible option for her.  Plans more walking strategies for increased activity and diet reviewed to work on Mediterranean style diet and portion control and reducing sugars and carbs..   Needs to finish Shingrix series.  She will do this at a convenient time for her.  UTD with colonoscopy.  Crohns reasonably controlled.  Following up with GI regularly.  Prilosec controls GERD.  On Prozac and doing well.   Seeing GYN and having mammograms at [facility].                Current Outpatient Medications   Medication Sig Dispense Refill    FLUoxetine (PROzac) 10 mg capsule TAKE 1 CAPSULE BY MOUTH EVERY DAY 90 capsule 1    omeprazole (PriLOSEC) 20 mg capsule Take 1 capsule (20 mg total) by mouth daily before breakfast.      sulfaSALAzine (AZULFIDINE) 500 mg tablet Take 1 tablet (500 mg total) by mouth 4 (four) times a day.       No current facility-administered medications for this visit.        Patient Active Problem List   Diagnosis    Class 2 obesity due to excess calories with body mass index (BMI) of 36.0 to 36.9 in adult    Major depressive disorder    Allergic rhinitis    Crohn's colitis (CMS/HCC)    Gastroesophageal reflux disease without esophagitis    ASD (atrial septal defect)    Elevated LFTs    Impaired fasting glucose    Family history of thyroid cancer    Family history of breast cancer    Thyroid nodule    Elevated blood pressure reading in office without diagnosis of hypertension    Snoring       Past Medical History:   Diagnosis Date    Allergic rhinitis 2019    Class 2 obesity due to excess calories with body mass index (BMI) of 36.0 to 36.9 in adult 2019    Crohn's colitis (CMS/HCC)     Elevated LFTs 3/6/2019    Gastroesophageal reflux disease without esophagitis 2019    Impaired fasting glucose 3/6/2019    Major depressive disorder 2019       Past Surgical History:   Procedure Laterality Date     SECTION      COLONOSCOPY  2017    TONSILLECTOMY         Family History   Problem Relation Age of Onset    Breast cancer Biological Mother     Hodgkin's lymphoma Biological Mother     Thyroid cancer Biological Mother     Leukemia Biological Father        Social History     Socioeconomic History    Marital status:      Spouse name: Not on file    Number of children: Not on file    Years of education: Not on file    Highest education level: Not on file   Occupational History    Not on file   Tobacco Use    Smoking status: Every Day     Packs/day: .5     Types: Cigarettes    Smokeless tobacco: Never   Substance and Sexual Activity    Alcohol use: Yes     Comment: socially    Drug use: No    Sexual activity: Not on file   Other Topics Concern    Not on file   Social History Narrative    Not on file     Social Determinants of Health     Financial Resource Strain: Not on file   Food Insecurity: Not on file   Transportation Needs: Not on file   Physical  "Activity: Not on file   Stress: Not on file   Social Connections: Not on file   Intimate Partner Violence: Not on file   Housing Stability: Not on file       Allergies   Allergen Reactions    Penicillins Rash       Review of Systems   Constitutional:  Negative for unexpected weight change.   Eyes:  Negative for visual disturbance.   Respiratory:  Negative for shortness of breath.    Cardiovascular:  Negative for chest pain.   Gastrointestinal:  Negative for abdominal pain.   Genitourinary:  Negative for frequency.   Musculoskeletal:  Negative for joint swelling.   Skin:  Negative for rash.   Neurological:  Negative for weakness.   Psychiatric/Behavioral:  Negative for dysphoric mood.      Repeat BP by me 138/82  Vitals:    04/24/24 0930   BP: (!) 142/84   BP Location: Right upper arm   Patient Position: Sitting   Pulse: 68   Resp: 16   Temp: 36.6 °C (97.8 °F)   TempSrc: Temporal   SpO2: 97%   Weight: 102 kg (224 lb 12.8 oz)   Height: 1.6 m (5' 3\")     Body mass index is 39.82 kg/m².    Physical Exam  Vitals and nursing note reviewed.   Constitutional:       Appearance: She is well-developed.   HENT:      Head: Normocephalic.      Right Ear: Tympanic membrane, ear canal and external ear normal.      Left Ear: Tympanic membrane, ear canal and external ear normal.   Eyes:      Conjunctiva/sclera: Conjunctivae normal.   Neck:      Thyroid: No thyromegaly.      Trachea: No tracheal deviation.   Cardiovascular:      Rate and Rhythm: Normal rate and regular rhythm.      Heart sounds: Normal heart sounds. No murmur heard.     No gallop.   Pulmonary:      Effort: Pulmonary effort is normal.      Breath sounds: Normal breath sounds. No wheezing or rales.   Abdominal:      General: There is no distension.      Tenderness: There is no abdominal tenderness.   Musculoskeletal:      Right lower leg: No edema.      Left lower leg: No edema.   Lymphadenopathy:      Cervical: No cervical adenopathy.   Skin:     Findings: No rash. "   Neurological:      General: No focal deficit present.   Psychiatric:         Behavior: Behavior normal.         Thought Content: Thought content normal.         Judgment: Judgment normal.         Problem List Items Addressed This Visit       Snoring     Discussed importance of evaluation for possible sleep apnea and she will proceed with consult with Dr. Marie.         Relevant Orders    Ambulatory referral to Pulmonology    Major depressive disorder     Doing well at current Prozac dose and will continue.         Impaired fasting glucose     Discussed portion management of carbohydrate and sugar intake.  Reviewed role of weight loss, increase in exercise.  Await fasting labs.         Relevant Orders    Hemoglobin A1c    Elevated blood pressure reading in office without diagnosis of hypertension     She has been having borderline high blood pressure readings in the office and we will soon want to consider medication management.  She will pursue sleep apnea evaluation and treatment, work on some weight loss, low-sodium, and check fasting labs.  Suggest follow-up visit in 6 months with these measures, and if BP remains elevated, would suggest EKG and consideration of medication and would consider ACE inhibitor given her impaired glucose history.          Relevant Orders    Urinalysis with microscopic    Crohn's colitis (CMS/HCC)     Following regularly with gastroenterologist.  Has had good control recently.         Class 2 obesity due to excess calories with body mass index (BMI) of 36.0 to 36.9 in adult     She will start working on diet and exercise efforts and work on this through the next several months.  As we might see supplies of GLP-1 agonists improving, she could consider them in the future if she is not having success with other methods, we did review the benefits/risks, long-term use of the medication, and importance of diet and exercise with adequate proteins and fluids with treatment..         Relevant  Orders    TSH w reflex FT4    ASD (atrial septal defect)     Not having any cardiovascular effects.          Other Visit Diagnoses       Encounter for general adult medical examination without abnormal findings    -  Primary    Encouraged regular exercise, dietary efforts at weight loss, check fasting labs    Relevant Orders    Comprehensive metabolic panel    CBC and Differential    Lipid Prof w Refl    Hemoglobin A1c    TSH w reflex FT4            Return in about 6 months (around 10/24/2024) for Next scheduled follow-up.          Jessica Goodman MD  [facility]  Family [facility] in [place]  [address],  [address]  [place]  [address]  P: [phone]  F: [phone]

## 2024-04-24 NOTE — ASSESSMENT & PLAN NOTE
Discussed portion management of carbohydrate and sugar intake.  Reviewed role of weight loss, increase in exercise.  Await fasting labs.

## 2024-04-24 NOTE — ASSESSMENT & PLAN NOTE
Discussed importance of evaluation for possible sleep apnea and she will proceed with consult with Dr. Marie.   abnormal/expand...

## 2024-04-24 NOTE — ASSESSMENT & PLAN NOTE
She will start working on diet and exercise efforts and work on this through the next several months.  As we might see supplies of GLP-1 agonists improving, she could consider them in the future if she is not having success with other methods, we did review the benefits/risks, long-term use of the medication, and importance of diet and exercise with adequate proteins and fluids with treatment..

## 2024-04-24 NOTE — ASSESSMENT & PLAN NOTE
She has been having borderline high blood pressure readings in the office and we will soon want to consider medication management.  She will pursue sleep apnea evaluation and treatment, work on some weight loss, low-sodium, and check fasting labs.  Suggest follow-up visit in 6 months with these measures, and if BP remains elevated, would suggest EKG and consideration of medication and would consider ACE inhibitor given her impaired glucose history.

## 2024-07-08 ENCOUNTER — OFFICE VISIT (OUTPATIENT)
Dept: FAMILY MEDICINE | Facility: CLINIC | Age: 54
End: 2024-07-08
Payer: COMMERCIAL

## 2024-07-08 VITALS
BODY MASS INDEX: 39.37 KG/M2 | OXYGEN SATURATION: 99 % | SYSTOLIC BLOOD PRESSURE: 124 MMHG | TEMPERATURE: 97.6 F | HEIGHT: 63 IN | WEIGHT: 222.2 LBS | RESPIRATION RATE: 16 BRPM | HEART RATE: 84 BPM | DIASTOLIC BLOOD PRESSURE: 80 MMHG

## 2024-07-08 DIAGNOSIS — J01.00 ACUTE NON-RECURRENT MAXILLARY SINUSITIS: Primary | ICD-10-CM

## 2024-07-08 PROCEDURE — 99213 OFFICE O/P EST LOW 20 MIN: CPT | Performed by: NURSE PRACTITIONER

## 2024-07-08 PROCEDURE — 3008F BODY MASS INDEX DOCD: CPT | Performed by: NURSE PRACTITIONER

## 2024-07-08 RX ORDER — DOXYCYCLINE 100 MG/1
100 CAPSULE ORAL 2 TIMES DAILY
Qty: 14 CAPSULE | Refills: 0 | Status: SHIPPED | OUTPATIENT
Start: 2024-07-08 | End: 2024-07-15

## 2024-07-08 RX ORDER — BUDESONIDE 3 MG/1
CAPSULE, COATED PELLETS ORAL SEE ADMIN INSTRUCTIONS
COMMUNITY
Start: 2024-06-28 | End: 2025-03-11

## 2024-07-08 ASSESSMENT — ENCOUNTER SYMPTOMS
HEADACHES: 1
CHILLS: 0
FACIAL SWELLING: 1
TROUBLE SWALLOWING: 0
FATIGUE: 1
SHORTNESS OF BREATH: 0
SINUS PAIN: 1
SORE THROAT: 0
COUGH: 1
FEVER: 0
WHEEZING: 0
SINUS PRESSURE: 1

## 2024-07-08 NOTE — PATIENT INSTRUCTIONS
RUNNY NOSE / POST NASAL DRIP / SINUS CONGESTION:  Flonase (steroid nasal spray)  Astelin (antihistamine nasal spray)  Claritin (day)  Benadryl (night)    SORE THROAT:  Cepacol throat lozenges  Warm salt water gargles    COUGH:  Delsym syrup (dry)  Mucinex-DM or Robitussin-DM (wet)    PAIN / FEVER:  Tylenol       650mg every 4 hours (mild / moderate)       1000mg every 8 hours (severe)

## 2024-07-08 NOTE — ASSESSMENT & PLAN NOTE
Allergic to PCN so opting for doxycycline.  Recommend astelin, tylenol, delsym in addition to her other products.  RTO if not improving.

## 2024-07-08 NOTE — PROGRESS NOTES
[facility]  [address] [place] [address]  [phone]          Lima Gutierrez is a 53 y.o. female who presents with URI  Chief Complaint   Patient presents with    URI     Sinus pain and pressure, right ear pain, PND and cough.  Covid test this morning and was negative as well as last week.   X 10 days         URI x 10 days  OTC products are not helpful (nasacort, mucinex)  2 neg home covid tests  She traveled recently but felt sick prior to leaving        Medical History:  Past Medical History:   Diagnosis Date    Allergic rhinitis 2019    Class 2 obesity due to excess calories with body mass index (BMI) of 36.0 to 36.9 in adult 2019    Crohn's colitis (CMS/HCC)     Elevated LFTs 3/6/2019    Gastroesophageal reflux disease without esophagitis 2019    Impaired fasting glucose 3/6/2019    Major depressive disorder 2019       Surgical History:  Past Surgical History:   Procedure Laterality Date     SECTION      COLONOSCOPY  2017    TONSILLECTOMY         Social History:  Social History     Social History Narrative    Not on file       Family History:  Family History   Problem Relation Age of Onset    Breast cancer Biological Mother     Hodgkin's lymphoma Biological Mother     Thyroid cancer Biological Mother     Leukemia Biological Father        Allergies:  Penicillins    Current Medications:  Current Outpatient Medications   Medication Sig Dispense Refill    budesonide EC (ENTOCORT EC) 3 mg 24 hr capsule See admin instr. As directed      doxycycline hyclate (VIBRAMYCIN) 100 mg capsule Take 1 capsule (100 mg total) by mouth 2 (two) times a day for 7 days. 14 capsule 0    FLUoxetine (PROzac) 10 mg capsule TAKE 1 CAPSULE BY MOUTH EVERY DAY 90 capsule 1    omeprazole (PriLOSEC) 20 mg capsule Take 1 capsule (20 mg total) by mouth daily before breakfast.      sulfaSALAzine (AZULFIDINE) 500 mg tablet Take 1 tablet (500 mg total) by mouth 4 (four) times a day.    "    No current facility-administered medications for this visit.       Review of Systems:  Review of Systems   Constitutional:  Positive for fatigue. Negative for chills and fever.   HENT:  Positive for ear pain, facial swelling, sinus pressure and sinus pain. Negative for sore throat and trouble swallowing.    Respiratory:  Positive for cough. Negative for shortness of breath and wheezing.    Cardiovascular:  Negative for chest pain.   Neurological:  Positive for headaches.   All other systems reviewed and are negative.      Objective     Vital Signs:  Vitals:    07/08/24 1527   BP: 124/80   BP Location: Right upper arm   Patient Position: Sitting   Pulse: 84   Resp: 16   Temp: 36.4 °C (97.6 °F)   TempSrc: Temporal   SpO2: 99%   Weight: 101 kg (222 lb 3.2 oz)   Height: 1.6 m (5' 3\")       BMI:  Body mass index is 39.36 kg/m².     Physical Exam  Vitals reviewed.   Constitutional:       Appearance: Normal appearance. She is ill-appearing. She is not toxic-appearing.   HENT:      Head: Normocephalic and atraumatic.      Right Ear: Tympanic membrane is erythematous and bulging.      Left Ear: Tympanic membrane is injected.      Nose: Congestion present.      Right Sinus: Maxillary sinus tenderness (R > L) present.      Left Sinus: Maxillary sinus tenderness present.      Mouth/Throat:      Lips: Pink.      Mouth: Mucous membranes are moist.      Pharynx: Posterior oropharyngeal erythema present. No oropharyngeal exudate.      Tonsils: No tonsillar exudate.   Eyes:      Conjunctiva/sclera: Conjunctivae normal.   Cardiovascular:      Rate and Rhythm: Normal rate and regular rhythm.   Pulmonary:      Effort: Pulmonary effort is normal.      Breath sounds: Normal breath sounds.   Musculoskeletal:      Cervical back: Normal range of motion and neck supple.   Lymphadenopathy:      Cervical: No cervical adenopathy.   Skin:     General: Skin is warm and dry.   Neurological:      Mental Status: She is alert and oriented to " person, place, and time.   Psychiatric:         Behavior: Behavior normal.         Lab Results   Component Value Date    WBC 5.1 06/13/2022    HGB 12.8 06/13/2022    HCT 38.4 06/13/2022     06/13/2022    CHOL 178 06/13/2022    TRIG 98 06/13/2022    HDL 60 06/13/2022    ALT 37 (H) 06/13/2022    AST 22 06/13/2022     06/13/2022    K 4.8 06/13/2022     06/13/2022    CREATININE 0.58 06/13/2022    BUN 16 06/13/2022    CO2 29 06/13/2022    TSH 3.40 06/13/2022    HGBA1C 5.5 06/13/2022        Procedures   Assessment     Patient Instructions   RUNNY NOSE / POST NASAL DRIP / SINUS CONGESTION:  Flonase (steroid nasal spray)  Astelin (antihistamine nasal spray)  Claritin (day)  Benadryl (night)    SORE THROAT:  Cepacol throat lozenges  Warm salt water gargles    COUGH:  Delsym syrup (dry)  Mucinex-DM or Robitussin-DM (wet)    PAIN / FEVER:  Tylenol       650mg every 4 hours (mild / moderate)       1000mg every 8 hours (severe)      Problem List Items Addressed This Visit       Acute non-recurrent maxillary sinusitis - Primary     Allergic to PCN so opting for doxycycline.  Recommend astelin, tylenol, delsym in addition to her other products.  RTO if not improving.               The total time spent ON THE DAY OF THE VISIT was 20 minutes, including preparing to see the patient, obtaining and reviewing separately obtained history, performing medically appropriate examination or evaluation, counseling and educating patient/family/caregiver, ordering medications, tests or procedures, referring and communicating with other health care professionals, documenting clinical information in electronic or other record, independently interpreting and communicating results to patient/family/caregiver, and/or care coordination.           MADIE Cabello  7/8/2024

## 2025-01-02 DIAGNOSIS — F32.9 MAJOR DEPRESSIVE DISORDER WITH CURRENT ACTIVE EPISODE, UNSPECIFIED DEPRESSION EPISODE SEVERITY, UNSPECIFIED WHETHER RECURRENT: ICD-10-CM

## 2025-01-02 RX ORDER — FLUOXETINE 10 MG/1
CAPSULE ORAL DAILY
Qty: 90 CAPSULE | Refills: 0 | Status: SHIPPED | OUTPATIENT
Start: 2025-01-02

## 2025-03-11 ENCOUNTER — OFFICE VISIT (OUTPATIENT)
Dept: FAMILY MEDICINE | Facility: CLINIC | Age: 55
End: 2025-03-11
Payer: COMMERCIAL

## 2025-03-11 VITALS
WEIGHT: 222 LBS | RESPIRATION RATE: 16 BRPM | OXYGEN SATURATION: 98 % | SYSTOLIC BLOOD PRESSURE: 140 MMHG | DIASTOLIC BLOOD PRESSURE: 100 MMHG | HEIGHT: 63 IN | HEART RATE: 67 BPM | TEMPERATURE: 97.7 F | BODY MASS INDEX: 39.34 KG/M2

## 2025-03-11 DIAGNOSIS — J32.9 RHINOSINUSITIS: Primary | ICD-10-CM

## 2025-03-11 DIAGNOSIS — K50.10 CROHN'S DISEASE OF COLON WITHOUT COMPLICATION (CMS/HCC): ICD-10-CM

## 2025-03-11 DIAGNOSIS — Q21.10 ASD (ATRIAL SEPTAL DEFECT): ICD-10-CM

## 2025-03-11 PROCEDURE — 99213 OFFICE O/P EST LOW 20 MIN: CPT | Performed by: FAMILY MEDICINE

## 2025-03-11 PROCEDURE — 3008F BODY MASS INDEX DOCD: CPT | Performed by: FAMILY MEDICINE

## 2025-03-11 RX ORDER — DOXYCYCLINE HYCLATE 100 MG
100 TABLET ORAL 2 TIMES DAILY
Qty: 20 TABLET | Refills: 0 | Status: SHIPPED | OUTPATIENT
Start: 2025-03-11 | End: 2025-03-21

## 2025-03-11 RX ORDER — PROMETHAZINE HYDROCHLORIDE AND DEXTROMETHORPHAN HYDROBROMIDE 6.25; 15 MG/5ML; MG/5ML
5 SYRUP ORAL EVERY 6 HOURS PRN
Qty: 120 ML | Refills: 0 | Status: SHIPPED | OUTPATIENT
Start: 2025-03-11 | End: 2025-03-18

## 2025-03-11 RX ORDER — PREDNISONE 10 MG/1
TABLET ORAL
Qty: 30 TABLET | Refills: 0 | Status: SHIPPED | OUTPATIENT
Start: 2025-03-11 | End: 2025-03-23

## 2025-03-11 NOTE — PROGRESS NOTES
"    Consent obtained from patient and all parties present in the room? yes    I have obtained the consent of everyone present in the room to make an audio recording of this visit to assist me in documenting the encounter in the EMR.     Subjective     Patient ID: Lima Gutierrez, : 1970 is a 54 y.o. female who presents for Sinus Problem, Cough, and Earache / Otalgia    History of Present Illness  The patient presents for evaluation of sinus infection.    She began experiencing a scratchy throat on Friday evening, which progressed to a persistent cough that disrupted her sleep. The severity of her symptoms escalated over the weekend, with increased congestion, sinus pressure, and pain radiating into her teeth and ears. She has been taking Mucinex DM, which has resulted in the expectoration of green nasal discharge. She also uses Zicam. She reports no shortness of breath or wheezing but mentions intermittent episodes of feeling hot and unwell, although her temperature readings have remained within normal limits. She has no known lung issues. She has a history of recurrent sinus infections, including one in the summer. She has not attempted nasal irrigation.    She experiences ear itching and occasional crackling sounds, particularly when lying down.    She has a history of Crohn's disease. She is currently on sulfasalazine.     ALLERGIES  The patient is allergic to PENICILLIN.      The following have been reviewed and updated as appropriate in this visit:   Allergies  Meds  Problems         Objective   Vitals:    25 1150   BP: (!) 140/100   BP Location: Left upper arm   Patient Position: Sitting   Pulse: 67   Resp: 16   Temp: 36.5 °C (97.7 °F)   TempSrc: Temporal   SpO2: 98%   Weight: 101 kg (222 lb)   Height: 1.6 m (5' 3\")     Body mass index is 39.33 kg/m².    Physical Exam  There is a fluid in the right ear. Nasal passages were examined.  Lungs sound clear.    Results         Assessment & Plan  1. " Sinus infection.  Symptoms include a scratchy throat, cough, congestion, sinus pressure, and pain radiating to the teeth and ears. Green nasal and chest mucus was noted. The patient has a history of sinus infections and had sinus surgery years ago. A prescription for doxycycline 100 mg, to be taken twice daily for 10 days, has been issued. Additionally, a 12-day course of prednisone has been prescribed, starting with 40 mg (4 tablets) for 3 days, then tapering to 30 mg (3 tablets) for 3 days, 20 mg (2 tablets) for 3 days, and finally 10 mg (1 tablet) for 3 days. A cough syrup, promethazine DM, has been prescribed for nighttime use. She is advised to discontinue Mucinex DM while using the cough syrup. The use of nasal irrigation twice daily has been recommended, followed by Nasacort administration. If symptoms persist, she should inform the clinic.         Lima was seen today for sinus problem, cough and earache / otalgia.    Diagnoses and all orders for this visit:    Rhinosinusitis  -     doxycycline hyclate (VIBRA-TABS) 100 mg tablet; Take 1 tablet (100 mg total) by mouth 2 (two) times a day for 10 days.  -     predniSONE (DELTASONE) 10 mg tablet; Take 4 tablets (40 mg total) by mouth daily for 3 days, THEN 3 tablets (30 mg total) daily for 3 days, THEN 2 tablets (20 mg total) daily for 3 days, THEN 1 tablet (10 mg total) daily for 3 days.  -     promethazine-DM (PROMETHAZINE-DM) 6.25-15 mg/5 mL syrup; Take 5 mL by mouth every 6 (six) hours as needed for cough for up to 7 days.    Crohn's disease of colon without complication (CMS/[facility])  Comments:  Stable    ASD (atrial septal defect)  Comments:  Stable

## 2025-04-11 DIAGNOSIS — F32.9 MAJOR DEPRESSIVE DISORDER WITH CURRENT ACTIVE EPISODE, UNSPECIFIED DEPRESSION EPISODE SEVERITY, UNSPECIFIED WHETHER RECURRENT: ICD-10-CM

## 2025-04-11 RX ORDER — FLUOXETINE 10 MG/1
CAPSULE ORAL DAILY
Qty: 90 CAPSULE | Refills: 0 | Status: SHIPPED | OUTPATIENT
Start: 2025-04-11

## 2025-06-18 NOTE — PROGRESS NOTES
Lima Gutierrez is a 54 y.o. female presenting today for:   Chief Complaint   Patient presents with    Annual Exam        HPI         crohns   Follows with gi  On sulfasalazine    Elevated BP read prior    Snoring   Was going to get sleep study done but had to cancel  Is working on rescheduling     Hx of thyroid nodules  No f/u needed    Weight los goals  Signed up for hers   Was started on topamax and metformin    Depression  prozac    Social History/ Health Maintenance   Diet: balanced cooks at home   Exercise: does not really exercise  Employment: works from home   Tobacco : since she was 17 has cut down to 4 cigs a day pack year was smoking 1/2 to 1 pack per day   Alcohol: about 3x a week      Immunizations   Flu:   Tdap :   Shingrix age 50+ - 2 doses by 8 weeks   Pneumonia age 65 -  COVID:      Screenings   Mammogram: gets annually follows with breast center  PAP smear: is due   DEXA Scan: n/a   Colon cancer: oct 2022 3 year f/u  Lung cancer: (50-80 with 20 py smoking hx, quit < 15 yrs ago )  is due   Hep c / HIV       ROS   All systems reviewed and negative except as otherwise stated in HPI       Past Medical History:   Diagnosis Date    Allergic rhinitis 2/18/2019    Class 2 obesity due to excess calories with body mass index (BMI) of 36.0 to 36.9 in adult 2/18/2019    Crohn's colitis (CMS/HCC)     Elevated LFTs 3/6/2019    Gastroesophageal reflux disease without esophagitis 2/18/2019    Impaired fasting glucose 3/6/2019    Major depressive disorder 2/18/2019      Penicillins  Current Outpatient Medications   Medication Sig Dispense Refill    FLUoxetine (PROzac) 10 mg capsule TAKE 1 CAPSULE BY MOUTH EVERY DAY 90 capsule 0    nutritional supplements powder Take by mouth.      omeprazole (PriLOSEC) 20 mg capsule Take 1 capsule (20 mg total) by mouth daily before breakfast.      sulfaSALAzine (AZULFIDINE) 500 mg tablet Take 1 tablet (500 mg total) by mouth 4 (four) times a day.       No current  "facility-administered medications for this visit.            Objective       Vitals:    06/19/25 1413 06/19/25 1442   BP: (!) 148/90 130/80   BP Location: Right upper arm    Patient Position: Sitting    Pulse: 73    Resp: 16    Temp: 36.2 °C (97.1 °F)    TempSrc: Temporal    SpO2: 98%    Weight: 102 kg (224 lb)    Height: 1.626 m (5' 4\")           Wt Readings from Last 3 Encounters:   06/19/25 102 kg (224 lb)   03/11/25 101 kg (222 lb)   07/08/24 101 kg (222 lb 3.2 oz)       Physical Exam  Constitutional:       Appearance: Normal appearance.   HENT:      Head: Normocephalic and atraumatic.      Right Ear: Tympanic membrane, ear canal and external ear normal.      Left Ear: Tympanic membrane, ear canal and external ear normal.      Nose: Nose normal.      Mouth/Throat:      Mouth: Mucous membranes are moist.      Pharynx: Oropharynx is clear.   Eyes:      Conjunctiva/sclera: Conjunctivae normal.      Pupils: Pupils are equal, round, and reactive to light.   Cardiovascular:      Rate and Rhythm: Normal rate and regular rhythm.      Pulses: Normal pulses.      Heart sounds: Normal heart sounds.   Pulmonary:      Effort: Pulmonary effort is normal.      Breath sounds: Normal breath sounds.   Musculoskeletal:      Cervical back: Neck supple.   Lymphadenopathy:      Cervical: No cervical adenopathy.   Neurological:      General: No focal deficit present.      Mental Status: She is alert.            Assessment / Plan    Diagnoses and all orders for this visit:    Annual physical exam (Primary)    Impaired fasting glucose  -     Lipid panel; Future  -     TSH w reflex FT4; Future  -     CBC and Differential; Future  -     Comprehensive metabolic panel; Future  -     Hemoglobin A1c; Future    Elevated LFTs  Assessment & Plan:  New labs ordered    Orders:  -     Lipid panel; Future  -     TSH w reflex FT4; Future  -     CBC and Differential; Future  -     Comprehensive metabolic panel; Future  -     Hemoglobin A1c; " Future    Tobacco use  Assessment & Plan:  Due for CT lung    Orders:  -     CT LUNG SCREENING WITHOUT IV CONTRAST; Future    Elevated blood pressure reading in office without diagnosis of hypertension  Assessment & Plan:  Bp recheck in range today but will continue to monitor  Has joined [facility] to help with weight loss      Snoring  Assessment & Plan:  Working on new apt for sleep study      ASD (atrial septal defect)  Assessment & Plan:  Stable released from f/u 2008 no issues      Crohn's disease of colon without complication (CMS/HCC)  Assessment & Plan:  Follows with GI on sulfasalazine      Thyroid nodule  Assessment & Plan:  Last US 3/21   Did not met criteria for bx was 0.5cm      Class 2 obesity due to excess calories without serious comorbidity with body mass index (BMI) of 36.0 to 36.9 in adult  Assessment & Plan:  Working on weight los joined [facility]      Major depressive disorder with current active episode, unspecified depression episode severity, unspecified whether recurrent  Assessment & Plan:  Continues prozac               Return in about 6 months (around 12/19/2025) for follow up weight loss goals and BP check.    Leticia Spivey, DO

## 2025-06-19 ENCOUNTER — OFFICE VISIT (OUTPATIENT)
Dept: FAMILY MEDICINE | Facility: CLINIC | Age: 55
End: 2025-06-19
Payer: COMMERCIAL

## 2025-06-19 VITALS
WEIGHT: 224 LBS | DIASTOLIC BLOOD PRESSURE: 80 MMHG | RESPIRATION RATE: 16 BRPM | BODY MASS INDEX: 38.24 KG/M2 | TEMPERATURE: 97.1 F | HEART RATE: 73 BPM | OXYGEN SATURATION: 98 % | HEIGHT: 64 IN | SYSTOLIC BLOOD PRESSURE: 130 MMHG

## 2025-06-19 DIAGNOSIS — E66.812 CLASS 2 OBESITY DUE TO EXCESS CALORIES WITHOUT SERIOUS COMORBIDITY WITH BODY MASS INDEX (BMI) OF 36.0 TO 36.9 IN ADULT: ICD-10-CM

## 2025-06-19 DIAGNOSIS — Z72.0 TOBACCO USE: ICD-10-CM

## 2025-06-19 DIAGNOSIS — R03.0 ELEVATED BLOOD PRESSURE READING IN OFFICE WITHOUT DIAGNOSIS OF HYPERTENSION: ICD-10-CM

## 2025-06-19 DIAGNOSIS — Z00.00 ANNUAL PHYSICAL EXAM: Primary | ICD-10-CM

## 2025-06-19 DIAGNOSIS — Q21.10 ASD (ATRIAL SEPTAL DEFECT): ICD-10-CM

## 2025-06-19 DIAGNOSIS — F32.9 MAJOR DEPRESSIVE DISORDER WITH CURRENT ACTIVE EPISODE, UNSPECIFIED DEPRESSION EPISODE SEVERITY, UNSPECIFIED WHETHER RECURRENT: ICD-10-CM

## 2025-06-19 DIAGNOSIS — R79.89 ELEVATED LFTS: ICD-10-CM

## 2025-06-19 DIAGNOSIS — R06.83 SNORING: ICD-10-CM

## 2025-06-19 DIAGNOSIS — E66.09 CLASS 2 OBESITY DUE TO EXCESS CALORIES WITHOUT SERIOUS COMORBIDITY WITH BODY MASS INDEX (BMI) OF 36.0 TO 36.9 IN ADULT: ICD-10-CM

## 2025-06-19 DIAGNOSIS — K50.10 CROHN'S DISEASE OF COLON WITHOUT COMPLICATION (CMS/HCC): ICD-10-CM

## 2025-06-19 DIAGNOSIS — R73.01 IMPAIRED FASTING GLUCOSE: ICD-10-CM

## 2025-06-19 DIAGNOSIS — E04.1 THYROID NODULE: ICD-10-CM

## 2025-06-19 PROCEDURE — 3008F BODY MASS INDEX DOCD: CPT | Performed by: STUDENT IN AN ORGANIZED HEALTH CARE EDUCATION/TRAINING PROGRAM

## 2025-06-19 PROCEDURE — 99396 PREV VISIT EST AGE 40-64: CPT | Performed by: STUDENT IN AN ORGANIZED HEALTH CARE EDUCATION/TRAINING PROGRAM

## 2025-06-19 ASSESSMENT — PATIENT HEALTH QUESTIONNAIRE - PHQ9: SUM OF ALL RESPONSES TO PHQ9 QUESTIONS 1 & 2: 0

## 2025-06-20 ENCOUNTER — TELEPHONE (OUTPATIENT)
Dept: PULMONOLOGY | Facility: HOSPITAL | Age: 55
End: 2025-06-20
Payer: COMMERCIAL

## 2025-07-19 DIAGNOSIS — F32.9 MAJOR DEPRESSIVE DISORDER WITH CURRENT ACTIVE EPISODE, UNSPECIFIED DEPRESSION EPISODE SEVERITY, UNSPECIFIED WHETHER RECURRENT: ICD-10-CM

## 2025-07-21 RX ORDER — FLUOXETINE 10 MG/1
CAPSULE ORAL DAILY
Qty: 90 CAPSULE | Refills: 0 | Status: SHIPPED | OUTPATIENT
Start: 2025-07-21